# Patient Record
Sex: FEMALE | Race: WHITE | NOT HISPANIC OR LATINO | Employment: FULL TIME | URBAN - NONMETROPOLITAN AREA
[De-identification: names, ages, dates, MRNs, and addresses within clinical notes are randomized per-mention and may not be internally consistent; named-entity substitution may affect disease eponyms.]

---

## 2017-07-20 ENCOUNTER — LAB REQUISITION (OUTPATIENT)
Dept: LAB | Facility: HOSPITAL | Age: 52
End: 2017-07-20

## 2017-07-20 DIAGNOSIS — Z00.00 ENCOUNTER FOR GENERAL ADULT MEDICAL EXAMINATION WITHOUT ABNORMAL FINDINGS: ICD-10-CM

## 2017-07-20 PROCEDURE — 88305 TISSUE EXAM BY PATHOLOGIST: CPT | Performed by: GENERAL PRACTICE

## 2017-07-24 LAB
CYTO UR: NORMAL
LAB AP CASE REPORT: NORMAL
LAB AP CLINICAL INFORMATION: NORMAL
Lab: NORMAL
PATH REPORT.FINAL DX SPEC: NORMAL
PATH REPORT.GROSS SPEC: NORMAL

## 2017-10-11 ENCOUNTER — TRANSCRIBE ORDERS (OUTPATIENT)
Dept: ADMINISTRATIVE | Facility: HOSPITAL | Age: 52
End: 2017-10-11

## 2017-10-11 DIAGNOSIS — G56.03 CARPAL TUNNEL SYNDROME, BILATERAL UPPER LIMBS: Primary | ICD-10-CM

## 2017-10-23 ENCOUNTER — HOSPITAL ENCOUNTER (OUTPATIENT)
Dept: NEUROLOGY | Facility: HOSPITAL | Age: 52
Discharge: HOME | End: 2017-10-23
Admitting: PHYSICIAN ASSISTANT

## 2017-10-23 DIAGNOSIS — G56.03 CARPAL TUNNEL SYNDROME, BILATERAL UPPER LIMBS: ICD-10-CM

## 2017-10-23 PROCEDURE — 95911 NRV CNDJ TEST 9-10 STUDIES: CPT | Performed by: PSYCHIATRY & NEUROLOGY

## 2017-10-23 PROCEDURE — 95911 NRV CNDJ TEST 9-10 STUDIES: CPT

## 2017-10-31 PROCEDURE — 88302 TISSUE EXAM BY PATHOLOGIST: CPT | Performed by: GENERAL PRACTICE

## 2017-10-31 PROCEDURE — 88304 TISSUE EXAM BY PATHOLOGIST: CPT | Performed by: GENERAL PRACTICE

## 2017-11-01 ENCOUNTER — LAB REQUISITION (OUTPATIENT)
Dept: LAB | Facility: HOSPITAL | Age: 52
End: 2017-11-01

## 2017-11-01 DIAGNOSIS — Z00.00 ENCOUNTER FOR GENERAL ADULT MEDICAL EXAMINATION WITHOUT ABNORMAL FINDINGS: ICD-10-CM

## 2019-06-20 ENCOUNTER — TRANSCRIBE ORDERS (OUTPATIENT)
Dept: ADMINISTRATIVE | Facility: HOSPITAL | Age: 54
End: 2019-06-20

## 2019-06-20 DIAGNOSIS — Z12.39 SCREENING BREAST EXAMINATION: Primary | ICD-10-CM

## 2019-06-20 DIAGNOSIS — Z13.820 SCREENING FOR OSTEOPOROSIS: ICD-10-CM

## 2019-06-25 ENCOUNTER — HOSPITAL ENCOUNTER (OUTPATIENT)
Dept: MAMMOGRAPHY | Facility: HOSPITAL | Age: 54
Discharge: HOME | End: 2019-06-25
Admitting: OBSTETRICS & GYNECOLOGY

## 2019-06-25 ENCOUNTER — HOSPITAL ENCOUNTER (OUTPATIENT)
Dept: BONE DENSITY | Facility: HOSPITAL | Age: 54
Discharge: HOME | End: 2019-06-25

## 2019-06-25 DIAGNOSIS — Z13.820 SCREENING FOR OSTEOPOROSIS: ICD-10-CM

## 2019-06-25 DIAGNOSIS — Z12.39 SCREENING BREAST EXAMINATION: ICD-10-CM

## 2019-06-25 PROCEDURE — 77080 DXA BONE DENSITY AXIAL: CPT

## 2019-06-25 PROCEDURE — 77067 SCR MAMMO BI INCL CAD: CPT

## 2019-06-25 PROCEDURE — 77063 BREAST TOMOSYNTHESIS BI: CPT

## 2019-06-27 ENCOUNTER — TELEPHONE (OUTPATIENT)
Dept: NEUROSURGERY | Age: 54
End: 2019-06-27

## 2019-06-27 ENCOUNTER — TRANSCRIBE ORDERS (OUTPATIENT)
Dept: ADMINISTRATIVE | Facility: HOSPITAL | Age: 54
End: 2019-06-27

## 2019-06-27 DIAGNOSIS — N63.10 UNSPECIFIED LUMP IN THE RIGHT BREAST, UNSPECIFIED QUADRANT: Primary | ICD-10-CM

## 2019-06-27 NOTE — TELEPHONE ENCOUNTER
Neurosurgery New Patient Questionnaire      1. Referring physician? Margie Shin    2. Reason for referral?  Neck pain    3. Who is completing questionnaire? Patient    4. Has the patient had any previous spinal/brain surgeries? YES    a. If yes, [facility] the [facility]    b. What was the surgery? Cervical    c. Who was the surgeon? Patient unsure    d. When was this surgery? 2010    e. Why is the patient not following up with previous surgeon? Location/improved    f. Is this a second opinion? NO    5. Has the patient seen another neurosurgeon and/or orthopedic surgeon for this issue in the past?   NO    6. Have MRI images been obtained within the last year? MRI has been scheduled for next week at [facility].    7. If yes, where was the MRI performed? [facility]    Note: If scan was performed at a facility other than [facility], the disk will need to be brought to appointment!    a. Patient agreed to bring disk? YES    b. What part of the body? Cervical Spine    8. Has the patient had a NCV/EMG within the last year? NO    9. Has the patient participated in physical therapy within the last year? NO    10. Is the patient currently under contract with a pain management physician? YES    a. If yes, who is the physician? Dr. Man Schwartz    11. Does the patient currently take any pain medication? Suboxone    12. Employment Status? 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 Has the patient missed work due to pain? NO    15. Does the patient draw disability? NO    16. Symptoms? Neck pain with radiating pain into right shoulder and right arm. Numbess/tingling right hand. Patient states she drops things frequently. Arm/hand weakness.

## 2019-07-01 ENCOUNTER — HOSPITAL ENCOUNTER (OUTPATIENT)
Dept: MAMMOGRAPHY | Facility: HOSPITAL | Age: 54
Discharge: HOME | End: 2019-07-01
Admitting: OBSTETRICS & GYNECOLOGY

## 2019-07-01 DIAGNOSIS — N63.10 UNSPECIFIED LUMP IN THE RIGHT BREAST, UNSPECIFIED QUADRANT: ICD-10-CM

## 2019-07-01 PROCEDURE — 77065 DX MAMMO INCL CAD UNI: CPT

## 2019-07-11 ENCOUNTER — HOSPITAL ENCOUNTER (OUTPATIENT)
Dept: WOMENS IMAGING | Age: 54
Discharge: HOME | End: 2019-07-11
Payer: COMMERCIAL

## 2019-07-11 DIAGNOSIS — R92.8 ABNORMAL MAMMOGRAM: ICD-10-CM

## 2019-07-11 PROCEDURE — 2720000010 MAM STEREO BREAST BX W LOC DEVICE 1ST LESION RIGHT

## 2019-07-11 PROCEDURE — 88305 TISSUE EXAM BY PATHOLOGIST: CPT

## 2019-07-11 PROCEDURE — 77065 DX MAMMO INCL CAD UNI: CPT

## 2019-07-12 ENCOUNTER — TELEPHONE (OUTPATIENT)
Dept: OTHER | Age: 54
End: 2019-07-12

## 2019-07-15 ENCOUNTER — TELEPHONE (OUTPATIENT)
Dept: OTHER | Age: 54
End: 2019-07-15

## 2019-07-17 ENCOUNTER — APPOINTMENT (OUTPATIENT)
Dept: PREADMISSION TESTING | Facility: HOSPITAL | Age: 54
End: 2019-07-17

## 2019-07-17 VITALS
WEIGHT: 167.33 LBS | DIASTOLIC BLOOD PRESSURE: 57 MMHG | HEIGHT: 65 IN | SYSTOLIC BLOOD PRESSURE: 111 MMHG | HEART RATE: 71 BPM | RESPIRATION RATE: 16 BRPM | OXYGEN SATURATION: 95 % | BODY MASS INDEX: 27.88 KG/M2

## 2019-07-17 LAB
ALBUMIN SERPL-MCNC: 4.8 G/DL (ref 3.5–5)
ALBUMIN/GLOB SERPL: 1.5 G/DL (ref 1.1–2.5)
ALP SERPL-CCNC: 49 U/L (ref 24–120)
ALT SERPL W P-5'-P-CCNC: 27 U/L (ref 0–54)
ANION GAP SERPL CALCULATED.3IONS-SCNC: 8 MMOL/L (ref 4–13)
AST SERPL-CCNC: 37 U/L (ref 7–45)
BASOPHILS # BLD AUTO: 0.09 10*3/MM3 (ref 0–0.2)
BASOPHILS NFR BLD AUTO: 1.1 % (ref 0–2)
BILIRUB SERPL-MCNC: 0.4 MG/DL (ref 0.1–1)
BUN BLD-MCNC: 16 MG/DL (ref 5–21)
BUN/CREAT SERPL: 15.2 (ref 7–25)
CALCIUM SPEC-SCNC: 10.1 MG/DL (ref 8.4–10.4)
CHLORIDE SERPL-SCNC: 100 MMOL/L (ref 98–110)
CO2 SERPL-SCNC: 32 MMOL/L (ref 24–31)
CREAT BLD-MCNC: 1.05 MG/DL (ref 0.5–1.4)
DEPRECATED RDW RBC AUTO: 42.1 FL (ref 40–54)
EOSINOPHIL # BLD AUTO: 0.25 10*3/MM3 (ref 0–0.7)
EOSINOPHIL NFR BLD AUTO: 3 % (ref 0–4)
ERYTHROCYTE [DISTWIDTH] IN BLOOD BY AUTOMATED COUNT: 12.9 % (ref 12–15)
GFR SERPL CREATININE-BSD FRML MDRD: 55 ML/MIN/1.73
GLOBULIN UR ELPH-MCNC: 3.2 GM/DL
GLUCOSE BLD-MCNC: 109 MG/DL (ref 70–100)
HCT VFR BLD AUTO: 38.5 % (ref 37–47)
HGB BLD-MCNC: 12.5 G/DL (ref 12–16)
IMM GRANULOCYTES # BLD AUTO: 0.03 10*3/MM3 (ref 0–0.05)
IMM GRANULOCYTES NFR BLD AUTO: 0.4 % (ref 0–5)
LYMPHOCYTES # BLD AUTO: 3.52 10*3/MM3 (ref 0.72–4.86)
LYMPHOCYTES NFR BLD AUTO: 42.8 % (ref 15–45)
MCH RBC QN AUTO: 28.6 PG (ref 28–32)
MCHC RBC AUTO-ENTMCNC: 32.5 G/DL (ref 33–36)
MCV RBC AUTO: 88.1 FL (ref 82–98)
MONOCYTES # BLD AUTO: 0.56 10*3/MM3 (ref 0.19–1.3)
MONOCYTES NFR BLD AUTO: 6.8 % (ref 4–12)
NEUTROPHILS # BLD AUTO: 3.78 10*3/MM3 (ref 1.87–8.4)
NEUTROPHILS NFR BLD AUTO: 45.9 % (ref 39–78)
NRBC BLD AUTO-RTO: 0 /100 WBC (ref 0–0.2)
PLATELET # BLD AUTO: 323 10*3/MM3 (ref 130–400)
PMV BLD AUTO: 10.6 FL (ref 6–12)
POTASSIUM BLD-SCNC: 3.7 MMOL/L (ref 3.5–5.3)
PROT SERPL-MCNC: 8 G/DL (ref 6.3–8.7)
RBC # BLD AUTO: 4.37 10*6/MM3 (ref 4.2–5.4)
SODIUM BLD-SCNC: 140 MMOL/L (ref 135–145)
WBC NRBC COR # BLD: 8.23 10*3/MM3 (ref 4.8–10.8)

## 2019-07-17 PROCEDURE — 85025 COMPLETE CBC W/AUTO DIFF WBC: CPT | Performed by: SPECIALIST

## 2019-07-17 PROCEDURE — 93010 ELECTROCARDIOGRAM REPORT: CPT | Performed by: INTERNAL MEDICINE

## 2019-07-17 PROCEDURE — 36415 COLL VENOUS BLD VENIPUNCTURE: CPT

## 2019-07-17 PROCEDURE — 93005 ELECTROCARDIOGRAM TRACING: CPT

## 2019-07-17 PROCEDURE — 80053 COMPREHEN METABOLIC PANEL: CPT | Performed by: SPECIALIST

## 2019-07-17 RX ORDER — LISINOPRIL 20 MG/1
20 TABLET ORAL DAILY
COMMUNITY

## 2019-07-17 RX ORDER — ESCITALOPRAM OXALATE 20 MG/1
20 TABLET ORAL DAILY
COMMUNITY
End: 2021-09-30 | Stop reason: SINTOL

## 2019-07-17 RX ORDER — SIMVASTATIN 40 MG
40 TABLET ORAL NIGHTLY
COMMUNITY

## 2019-07-17 RX ORDER — GLIPIZIDE 10 MG/1
10 TABLET ORAL
COMMUNITY
End: 2021-09-30 | Stop reason: SINTOL

## 2019-07-17 RX ORDER — GABAPENTIN 800 MG/1
800 TABLET ORAL 4 TIMES DAILY
COMMUNITY

## 2019-07-17 RX ORDER — LEVOTHYROXINE SODIUM 0.05 MG/1
25 TABLET ORAL DAILY
COMMUNITY

## 2019-07-17 RX ORDER — PANTOPRAZOLE SODIUM 40 MG/1
40 TABLET, DELAYED RELEASE ORAL DAILY
COMMUNITY
End: 2021-09-30 | Stop reason: SINTOL

## 2019-07-17 RX ORDER — BUPRENORPHINE HYDROCHLORIDE AND NALOXONE HYDROCHLORIDE DIHYDRATE 8; 2 MG/1; MG/1
1 TABLET SUBLINGUAL DAILY
COMMUNITY

## 2019-07-17 RX ORDER — FENOFIBRATE 160 MG/1
160 TABLET ORAL DAILY
COMMUNITY

## 2019-07-17 RX ORDER — HYDROCHLOROTHIAZIDE 25 MG/1
25 TABLET ORAL DAILY
COMMUNITY

## 2019-07-17 NOTE — DISCHARGE INSTRUCTIONS
DAY OF SURGERY INSTRUCTIONS        YOUR SURGEON: DR.APRIL BERNAL    PROCEDURE: RIGHT NEEDLE DIRECTED PARTIAL MASTECTOMY AND SENTINEL LYMPH NODE BIOPSY, RADIOLOGIST TO INJECT AND SCAN    DATE OF SURGERY: July 19, 2019    ARRIVAL TIME: AS DIRECTED BY OFFICE    YOU MAY TAKE THE FOLLOWING MEDICATION(S) THE MORNING OF SURGERY WITH A SIP OF WATER: ***      ALL OTHER HOME MEDICATION CHECK WITH YOUR PHYSICIAN                MANAGING PAIN AFTER SURGERY    We know you are probably wondering what your pain will be like after surgery.  Following surgery it is unrealistic to expect you will not have pain.   Pain is how our bodies let us know that something is wrong or cautions us to be careful.  That said, our goal is to make your pain tolerable.    Methods we may use to treat your pain include (oral or IV medications, PCAs, epidurals, nerve blocks, etc.)   While some procedures require IV pain medications for a short time after surgery, transitioning to pain medications by mouth allows for better management of pain.   Your nurse will encourage you to take oral pain medications whenever possible.  IV medications work almost immediately, but only last a short while.  Taking medications by mouth allows for a more constant level of medication in your blood stream for a longer period of time.      Once your pain is out of control it is harder to get back under control.  It is important you are aware when your next dose of pain medication is due.  If you are admitted, your nurse may write the time of your next dose on the white board in your room to help you remember.      We are interested in your pain and encourage you to inform us about aggravating factors during your visit.   Many times a simple repositioning every few hours can make a big difference.    If your physician says it is okay, do not let your pain prevent you from getting out of bed. Be sure to call your nurse for assistance prior to getting up so you do not fall.       Before surgery, please decide your tolerable pain goal.  These faces help describe the pain ratings we use on a 0-10 scale.   Be prepared to tell us your goal and whether or not you take pain or anxiety medications at home.          BEFORE YOU COME TO THE HOSPITAL  (Pre-op instructions)  • Do not eat, drink, smoke or chew gum after midnight the night before surgery.  This also includes no mints.  • Morning of surgery take only the medicines you have been instructed with a sip of water unless otherwise instructed  by your physician.  • Do not shave, wear makeup or dark nail polish.  • Remove all jewelry including rings.  • Leave anything you consider valuable at home.  • Leave your suitcase in the car until after your surgery.  • Bring the following with you if applicable:  o Picture ID and insurance, Medicare or Medicaid cards  o Co-pay/deductible required by insurance (cash, check, credit card)  o Copy of advance directive, living will or power-of- documents if not brought to PAT  o CPAP or BIPAP mask and tubing  o Relaxation aids (MP3 player, book, magazine)  • On the day of surgery check in at registration located at the main entrance of the hospital.       Outpatient Surgery Guidelines, Adult  Outpatient procedures are those for which the person having the procedure is allowed to go home the same day as the procedure. Various procedures are done on an outpatient basis. You should follow some general guidelines if you will be having an outpatient procedure.  LET YOUR HEALTH CARE PROVIDER KNOW ABOUT:  · Any allergies you have.  · All medicines you are taking, including vitamins, herbs, eye drops, creams, and over-the-counter medicines.  · Previous problems you or members of your family have had with the use of anesthetics.  · Any blood disorders you have.  · Previous surgeries you have had.  · Medical conditions you have.  RISKS AND COMPLICATIONS  Your health care provider will discuss possible risks  and complications with you before surgery. Common risks and complications include:    · Problems due to the use of anesthetics.  · Blood loss and replacement (does not apply to minor surgical procedures).  · Temporary increase in pain due to surgery.  · Uncorrected pain or problems that the surgery was meant to correct.  · Infection.  · New damage.  BEFORE THE PROCEDURE  · Ask your health care provider about changing or stopping your regular medicines. You may need to stop taking certain medicines in the days or weeks before the procedure.  · Stop smoking at least 2 weeks before surgery. This lowers your risk for complications during and after surgery. Ask your health care provider for help with this if needed.  · Eat your usual meals and a light supper the day before surgery. Continue fluid intake. Do not drink alcohol.  · Do not eat or drink after midnight the night before your surgery.   · Arrange for someone to take you home and to stay with you for 24 hours after the procedure. Medicine given for your procedure may affect your ability to drive or to care for yourself.  · Call your health care provider's office if you develop an illness or problem that may prevent you from safely having your procedure.  AFTER THE PROCEDURE  After surgery, you will be taken to a recovery area, where your progress will be monitored. If there are no complications, you will be allowed to go home when you are awake, stable, and taking fluids well. You may have numbness around the surgical site. Healing will take some time. You will have tenderness at the surgical site and may have some swelling and bruising. You may also have some nausea.  HOME CARE INSTRUCTIONS  · Do not drive for 24 hours, or as directed by your health care provider. Do not drive while taking prescription pain medicines.  · Do not drink alcohol for 24 hours.  · Do not make important decisions or sign legal documents for 24 hours.  · You may resume a normal diet and  activities as directed.  · Do not lift anything heavier than 10 pounds (4.5 kg) or play contact sports until your health care provider says it is okay.  · Change your bandages (dressings) as directed.  · Only take over-the-counter or prescription medicines as directed by your health care provider.  · Follow up with your health care provider as directed.  SEEK MEDICAL CARE IF:  · You have increased bleeding (more than a small spot) from the surgical site.  · You have redness, swelling, or increasing pain in the wound.  · You see pus coming from the wound.  · You have a fever.  · You notice a bad smell coming from the wound or dressing.  · You feel lightheaded or faint.  · You develop a rash.  · You have trouble breathing.  · You develop allergies.  MAKE SURE YOU:  · Understand these instructions.  · Will watch your condition.  · Will get help right away if you are not doing well or get worse.     This information is not intended to replace advice given to you by your health care provider. Make sure you discuss any questions you have with your health care provider.     Document Released: 09/12/2002 Document Revised: 05/03/2016 Document Reviewed: 05/22/2014  [employer] Interactive Patient Education ©2016 [employer]       Fall Prevention in Hospitals, Adult  As a hospital patient, your condition and the treatments you receive can increase your risk for falls. Some additional risk factors for falls in a hospital include:  · Being in an unfamiliar environment.  · Being on bed rest.  · Your surgery.  · Taking certain medicines.  · Your tubing requirements, such as intravenous (IV) therapy or catheters.  It is important that you learn how to decrease fall risks while at the hospital. Below are important tips that can help prevent falls.  SAFETY TIPS FOR PREVENTING FALLS  Talk about your risk of falling.  · Ask your health care provider why you are at risk for falling. Is it your medicine, illness, tubing placement, or  something else?  · Make a plan with your health care provider to keep you safe from falls.  · Ask your health care provider or pharmacist about side effects of your medicines. Some medicines can make you dizzy or affect your coordination.  Ask for help.  · Ask for help before getting out of bed. You may need to press your call button.  · Ask for assistance in getting safely to the toilet.  · Ask for a walker or cane to be put at your bedside. Ask that most of the side rails on your bed be placed up before your health care provider leaves the room.  · Ask family or friends to sit with you.  · Ask for things that are out of your reach, such as your glasses, hearing aids, telephone, bedside table, or call button.  Follow these tips to avoid falling:  · Stay lying or seated, rather than standing, while waiting for help.  · Wear rubber-soled slippers or shoes whenever you walk in the hospital.  · Avoid quick, sudden movements.  ¨ Change positions slowly.  ¨ Sit on the side of your bed before standing.  ¨ Stand up slowly and wait before you start to walk.  · Let your health care provider know if there is a spill on the floor.  · Pay careful attention to the medical equipment, electrical cords, and tubes around you.  · When you need help, use your call button by your bed or in the bathroom. Wait for one of your health care providers to help you.  · If you feel dizzy or unsure of your footing, return to bed and wait for assistance.  · Avoid being distracted by the TV, telephone, or another person in your room.  · Do not lean or support yourself on rolling objects, such as IV poles or bedside tables.     This information is not intended to replace advice given to you by your health care provider. Make sure you discuss any questions you have with your health care provider.     Document Released: 12/15/2001 Document Revised: 01/08/2016 Document Reviewed: 08/25/2013  [employer] Interactive Patient Education ©2016 [employer]       Surgical Site Infections FAQs  What is a Surgical Site Infection (SSI)?  A surgical site infection is an infection that occurs after surgery in the part of the body where the surgery took place. Most patients who have surgery do not develop an infection. However, infections develop in about 1 to 3 out of every 100 patients who have surgery.  Some of the common symptoms of a surgical site infection are:  · Redness and pain around the area where you had surgery  · Drainage of cloudy fluid from your surgical wound  · Fever  Can SSIs be treated?  Yes. Most surgical site infections can be treated with antibiotics. The antibiotic given to you depends on the bacteria (germs) causing the infection. Sometimes patients with SSIs also need another surgery to treat the infection.  What are some of the things that hospitals are doing to prevent SSIs?  To prevent SSIs, doctors, nurses, and other healthcare providers:  · Clean their hands and arms up to their elbows with an antiseptic agent just before the surgery.  · Clean their hands with soap and water or an alcohol-based hand rub before and after caring for each patient.  · May remove some of your hair immediately before your surgery using electric clippers if the hair is in the same area where the procedure will occur. They should not shave you with a razor.  · Wear special hair covers, masks, gowns, and gloves during surgery to keep the surgery area clean.  · Give you antibiotics before your surgery starts. In most cases, you should get antibiotics within 60 minutes before the surgery starts and the antibiotics should be stopped within 24 hours after surgery.  · Clean the skin at the site of your surgery with a special soap that kills germs.  What can I do to help prevent SSIs?  Before your surgery:  · Tell your doctor about other medical problems you may have. Health problems such as allergies, diabetes, and obesity could affect your surgery and your  treatment.  · Quit smoking. Patients who smoke get more infections. Talk to your doctor about how you can quit before your surgery.  · Do not shave near where you will have surgery. Shaving with a razor can irritate your skin and make it easier to develop an infection.  At the time of your surgery:  · Speak up if someone tries to shave you with a razor before surgery. Ask why you need to be shaved and talk with your surgeon if you have any concerns.  · Ask if you will get antibiotics before surgery.  After your surgery:  · Make sure that your healthcare providers clean their hands before examining you, either with soap and water or an alcohol-based hand rub.  · If you do not see your providers clean their hands, please ask them to do so.  · Family and friends who visit you should not touch the surgical wound or dressings.  · Family and friends should clean their hands with soap and water or an alcohol-based hand rub before and after visiting you. If you do not see them clean their hands, ask them to clean their hands.  What do I need to do when I go home from the hospital?  · Before you go home, your doctor or nurse should explain everything you need to know about taking care of your wound. Make sure you understand how to care for your wound before you leave the hospital.  · Always clean your hands before and after caring for your wound.  · Before you go home, make sure you know who to contact if you have questions or problems after you get home.  · If you have any symptoms of an infection, such as redness and pain at the surgery site, drainage, or fever, call your doctor immediately.  If you have additional questions, please ask your doctor or nurse.  Developed and co-sponsored by The Society for Healthcare Epidemiology of [place] (SHEA); Infectious Diseases Society of [place] (IDSA); American Hospital Association; Association for Professionals in Infection Control and Epidemiology (APIC); Centers for Disease  Control and Prevention (CDC); and The Joint Commission.     This information is not intended to replace advice given to you by your health care provider. Make sure you discuss any questions you have with your health care provider.     Document Released: 12/23/2014 Document Revised: 01/08/2016 Document Reviewed: 03/02/2016  [facility] Interactive Patient Education ©2016 [employer].       [facility]  CHG 4% Patient Instruction Sheet    Preparing the Skin Before Surgery  Preparing or “prepping” skin before surgery can reduce the risk of infection at the surgical site. To make the process easier,[facility] has chosen 4% Chlorhexidine Gluconate (CHG) antiseptic solution.   The steps below outline the prepping process and should be carefully followed.                                                                                                                                                      Prep the skin at the following time(s):                                                      We recommend you shower the night before surgery, and again the morning of surgery with the 4% CHG antiseptic solution using half of the bottle and a cloth each time.  Dress in clean clothes/sleepwear after showering.  See instructions below for application.          Do not apply any lotions or moisturizers.       Do not shave the area to be prepped for at least 2 days prior to surgery.    Clipping the hair may be done immediately prior to your surgery at the hospital    if needed.    Directions:  Thoroughly rinse your body with water.  Apply 4% CHG to a cloth and wash skin gently, paying special attention to the operative site.  Rinse again thoroughly.  Once you have begun using this product do not apply anything else to your skin. If itching or redness persists, rinse affected areas and discontinue use.    When using this product:  • Keep out of eyes, ears, and mouth.  • If solution should contact these areas, rinse out promptly  and thoroughly with water.  • For external use only.  • Do not use in genital area, on your face or head.      PATIENT/FAMILY/RESPONSIBLE PARTY VERBALIZES UNDERSTANDING OF ABOVE EDUCATION.  COPY OF PAIN SCALE GIVEN AND REVIEWED WITH VERBALIZED UNDERSTANDING.

## 2019-07-18 ENCOUNTER — TRANSCRIBE ORDERS (OUTPATIENT)
Dept: ADMINISTRATIVE | Facility: HOSPITAL | Age: 54
End: 2019-07-18

## 2019-07-18 DIAGNOSIS — D05.91 CARCINOMA IN SITU OF RIGHT BREAST, UNSPECIFIED TYPE: Primary | ICD-10-CM

## 2019-07-19 ENCOUNTER — HOSPITAL ENCOUNTER (OUTPATIENT)
Dept: NUCLEAR MEDICINE | Facility: HOSPITAL | Age: 54
Discharge: HOME | End: 2019-07-19

## 2019-07-19 ENCOUNTER — ANESTHESIA (OUTPATIENT)
Dept: PERIOP | Facility: HOSPITAL | Age: 54
End: 2019-07-19

## 2019-07-19 ENCOUNTER — HOSPITAL ENCOUNTER (OUTPATIENT)
Facility: HOSPITAL | Age: 54
Setting detail: HOSPITAL OUTPATIENT SURGERY
Discharge: HOME | End: 2019-07-19
Attending: SPECIALIST | Admitting: SPECIALIST

## 2019-07-19 ENCOUNTER — HOSPITAL ENCOUNTER (OUTPATIENT)
Dept: ULTRASOUND IMAGING | Facility: HOSPITAL | Age: 54
Discharge: HOME | End: 2019-07-19

## 2019-07-19 ENCOUNTER — ANESTHESIA EVENT (OUTPATIENT)
Dept: PERIOP | Facility: HOSPITAL | Age: 54
End: 2019-07-19

## 2019-07-19 ENCOUNTER — HOSPITAL ENCOUNTER (OUTPATIENT)
Dept: MAMMOGRAPHY | Facility: HOSPITAL | Age: 54
Discharge: HOME | End: 2019-07-19

## 2019-07-19 VITALS
SYSTOLIC BLOOD PRESSURE: 115 MMHG | RESPIRATION RATE: 16 BRPM | DIASTOLIC BLOOD PRESSURE: 68 MMHG | OXYGEN SATURATION: 93 % | TEMPERATURE: 97.7 F | HEART RATE: 67 BPM

## 2019-07-19 DIAGNOSIS — R92.1 BREAST CALCIFICATIONS: ICD-10-CM

## 2019-07-19 DIAGNOSIS — D05.91 CARCINOMA IN SITU OF RIGHT BREAST, UNSPECIFIED TYPE: ICD-10-CM

## 2019-07-19 LAB
GLUCOSE BLDC GLUCOMTR-MCNC: 149 MG/DL (ref 70–130)
GLUCOSE BLDC GLUCOMTR-MCNC: 163 MG/DL (ref 70–130)

## 2019-07-19 PROCEDURE — 88307 TISSUE EXAM BY PATHOLOGIST: CPT | Performed by: SPECIALIST

## 2019-07-19 PROCEDURE — 25010000002 ONDANSETRON PER 1 MG: Performed by: NURSE ANESTHETIST, CERTIFIED REGISTERED

## 2019-07-19 PROCEDURE — 25010000002 KETOROLAC TROMETHAMINE PER 15 MG: Performed by: NURSE ANESTHETIST, CERTIFIED REGISTERED

## 2019-07-19 PROCEDURE — 25010000002 FENTANYL CITRATE (PF) 250 MCG/5ML SOLUTION: Performed by: NURSE ANESTHETIST, CERTIFIED REGISTERED

## 2019-07-19 PROCEDURE — 78195 LYMPH SYSTEM IMAGING: CPT

## 2019-07-19 PROCEDURE — 25010000002 DEXAMETHASONE PER 1 MG: Performed by: ANESTHESIOLOGY

## 2019-07-19 PROCEDURE — A9541 TC99M SULFUR COLLOID: HCPCS | Performed by: SPECIALIST

## 2019-07-19 PROCEDURE — 25010000002 MIDAZOLAM PER 1 MG: Performed by: ANESTHESIOLOGY

## 2019-07-19 PROCEDURE — 76098 X-RAY EXAM SURGICAL SPECIMEN: CPT

## 2019-07-19 PROCEDURE — 82962 GLUCOSE BLOOD TEST: CPT

## 2019-07-19 PROCEDURE — 25010000002 PROPOFOL 10 MG/ML EMULSION: Performed by: NURSE ANESTHETIST, CERTIFIED REGISTERED

## 2019-07-19 PROCEDURE — 0 TECHNETIUM FILTERED SULFUR COLLOID: Performed by: SPECIALIST

## 2019-07-19 RX ORDER — SODIUM CHLORIDE, SODIUM LACTATE, POTASSIUM CHLORIDE, CALCIUM CHLORIDE 600; 310; 30; 20 MG/100ML; MG/100ML; MG/100ML; MG/100ML
100 INJECTION, SOLUTION INTRAVENOUS CONTINUOUS
Status: DISCONTINUED | OUTPATIENT
Start: 2019-07-19 | End: 2019-07-19 | Stop reason: HOSPADM

## 2019-07-19 RX ORDER — IBUPROFEN 600 MG/1
600 TABLET ORAL ONCE AS NEEDED
Status: CANCELLED | OUTPATIENT
Start: 2019-07-19

## 2019-07-19 RX ORDER — HYDRALAZINE HYDROCHLORIDE 20 MG/ML
5 INJECTION INTRAMUSCULAR; INTRAVENOUS
Status: DISCONTINUED | OUTPATIENT
Start: 2019-07-19 | End: 2019-07-19 | Stop reason: HOSPADM

## 2019-07-19 RX ORDER — OXYCODONE HYDROCHLORIDE AND ACETAMINOPHEN 5; 325 MG/1; MG/1
1 TABLET ORAL ONCE AS NEEDED
Status: CANCELLED | OUTPATIENT
Start: 2019-07-19

## 2019-07-19 RX ORDER — NALOXONE HCL 0.4 MG/ML
0.04 VIAL (ML) INJECTION AS NEEDED
Status: DISCONTINUED | OUTPATIENT
Start: 2019-07-19 | End: 2019-07-19 | Stop reason: HOSPADM

## 2019-07-19 RX ORDER — ONDANSETRON 2 MG/ML
4 INJECTION INTRAMUSCULAR; INTRAVENOUS ONCE AS NEEDED
Status: CANCELLED | OUTPATIENT
Start: 2019-07-19

## 2019-07-19 RX ORDER — IPRATROPIUM BROMIDE AND ALBUTEROL SULFATE 2.5; .5 MG/3ML; MG/3ML
3 SOLUTION RESPIRATORY (INHALATION) ONCE AS NEEDED
Status: DISCONTINUED | OUTPATIENT
Start: 2019-07-19 | End: 2019-07-19 | Stop reason: HOSPADM

## 2019-07-19 RX ORDER — LABETALOL HYDROCHLORIDE 5 MG/ML
5 INJECTION, SOLUTION INTRAVENOUS
Status: CANCELLED | OUTPATIENT
Start: 2019-07-19

## 2019-07-19 RX ORDER — ROCURONIUM BROMIDE 10 MG/ML
INJECTION, SOLUTION INTRAVENOUS AS NEEDED
Status: DISCONTINUED | OUTPATIENT
Start: 2019-07-19 | End: 2019-07-19 | Stop reason: SURG

## 2019-07-19 RX ORDER — KETOROLAC TROMETHAMINE 30 MG/ML
INJECTION, SOLUTION INTRAMUSCULAR; INTRAVENOUS AS NEEDED
Status: DISCONTINUED | OUTPATIENT
Start: 2019-07-19 | End: 2019-07-19 | Stop reason: SURG

## 2019-07-19 RX ORDER — ONDANSETRON 2 MG/ML
4 INJECTION INTRAMUSCULAR; INTRAVENOUS AS NEEDED
Status: DISCONTINUED | OUTPATIENT
Start: 2019-07-19 | End: 2019-07-19 | Stop reason: HOSPADM

## 2019-07-19 RX ORDER — SODIUM CHLORIDE 0.9 % (FLUSH) 0.9 %
3 SYRINGE (ML) INJECTION AS NEEDED
Status: DISCONTINUED | OUTPATIENT
Start: 2019-07-19 | End: 2019-07-19 | Stop reason: HOSPADM

## 2019-07-19 RX ORDER — MIDAZOLAM HYDROCHLORIDE 1 MG/ML
2 INJECTION INTRAMUSCULAR; INTRAVENOUS
Status: DISCONTINUED | OUTPATIENT
Start: 2019-07-19 | End: 2019-07-19 | Stop reason: HOSPADM

## 2019-07-19 RX ORDER — FENTANYL CITRATE 50 UG/ML
25 INJECTION, SOLUTION INTRAMUSCULAR; INTRAVENOUS AS NEEDED
Status: DISCONTINUED | OUTPATIENT
Start: 2019-07-19 | End: 2019-07-19 | Stop reason: HOSPADM

## 2019-07-19 RX ORDER — METOCLOPRAMIDE HYDROCHLORIDE 5 MG/ML
5 INJECTION INTRAMUSCULAR; INTRAVENOUS
Status: CANCELLED | OUTPATIENT
Start: 2019-07-19

## 2019-07-19 RX ORDER — OXYCODONE HYDROCHLORIDE AND ACETAMINOPHEN 5; 325 MG/1; MG/1
1 TABLET ORAL ONCE AS NEEDED
Status: CANCELLED | OUTPATIENT
Start: 2019-07-19 | End: 2019-07-29

## 2019-07-19 RX ORDER — OXYCODONE AND ACETAMINOPHEN 10; 325 MG/1; MG/1
1 TABLET ORAL ONCE AS NEEDED
Status: COMPLETED | OUTPATIENT
Start: 2019-07-19 | End: 2019-07-19

## 2019-07-19 RX ORDER — LIDOCAINE AND PRILOCAINE 25; 25 MG/G; MG/G
CREAM TOPICAL ONCE
Status: COMPLETED | OUTPATIENT
Start: 2019-07-19 | End: 2019-07-19

## 2019-07-19 RX ORDER — MIDAZOLAM HYDROCHLORIDE 1 MG/ML
1 INJECTION INTRAMUSCULAR; INTRAVENOUS
Status: DISCONTINUED | OUTPATIENT
Start: 2019-07-19 | End: 2019-07-19 | Stop reason: HOSPADM

## 2019-07-19 RX ORDER — SODIUM CHLORIDE, SODIUM LACTATE, POTASSIUM CHLORIDE, CALCIUM CHLORIDE 600; 310; 30; 20 MG/100ML; MG/100ML; MG/100ML; MG/100ML
1000 INJECTION, SOLUTION INTRAVENOUS CONTINUOUS
Status: DISCONTINUED | OUTPATIENT
Start: 2019-07-19 | End: 2019-07-19 | Stop reason: HOSPADM

## 2019-07-19 RX ORDER — ONDANSETRON 2 MG/ML
INJECTION INTRAMUSCULAR; INTRAVENOUS AS NEEDED
Status: DISCONTINUED | OUTPATIENT
Start: 2019-07-19 | End: 2019-07-19 | Stop reason: SURG

## 2019-07-19 RX ORDER — MAGNESIUM HYDROXIDE 1200 MG/15ML
LIQUID ORAL AS NEEDED
Status: DISCONTINUED | OUTPATIENT
Start: 2019-07-19 | End: 2019-07-19 | Stop reason: HOSPADM

## 2019-07-19 RX ORDER — MORPHINE SULFATE 2 MG/ML
2 INJECTION, SOLUTION INTRAMUSCULAR; INTRAVENOUS
Status: DISCONTINUED | OUTPATIENT
Start: 2019-07-19 | End: 2019-07-19 | Stop reason: HOSPADM

## 2019-07-19 RX ORDER — SODIUM CHLORIDE 0.9 % (FLUSH) 0.9 %
3 SYRINGE (ML) INJECTION EVERY 12 HOURS SCHEDULED
Status: DISCONTINUED | OUTPATIENT
Start: 2019-07-19 | End: 2019-07-19 | Stop reason: HOSPADM

## 2019-07-19 RX ORDER — NALOXONE HCL 0.4 MG/ML
0.4 VIAL (ML) INJECTION AS NEEDED
Status: CANCELLED | OUTPATIENT
Start: 2019-07-19

## 2019-07-19 RX ORDER — SODIUM CHLORIDE 0.9 % (FLUSH) 0.9 %
1-10 SYRINGE (ML) INJECTION AS NEEDED
Status: DISCONTINUED | OUTPATIENT
Start: 2019-07-19 | End: 2019-07-19 | Stop reason: HOSPADM

## 2019-07-19 RX ORDER — ACETAMINOPHEN 500 MG
1000 TABLET ORAL ONCE
Status: COMPLETED | OUTPATIENT
Start: 2019-07-19 | End: 2019-07-19

## 2019-07-19 RX ORDER — OXYCODONE HYDROCHLORIDE AND ACETAMINOPHEN 5; 325 MG/1; MG/1
1-2 TABLET ORAL EVERY 4 HOURS PRN
Qty: 10 TABLET | Refills: 0 | Status: SHIPPED | OUTPATIENT
Start: 2019-07-19

## 2019-07-19 RX ORDER — FENTANYL CITRATE 50 UG/ML
25 INJECTION, SOLUTION INTRAMUSCULAR; INTRAVENOUS AS NEEDED
Status: CANCELLED | OUTPATIENT
Start: 2019-07-19

## 2019-07-19 RX ORDER — FAMOTIDINE 10 MG/ML
20 INJECTION, SOLUTION INTRAVENOUS
Status: COMPLETED | OUTPATIENT
Start: 2019-07-19 | End: 2019-07-19

## 2019-07-19 RX ORDER — IPRATROPIUM BROMIDE AND ALBUTEROL SULFATE 2.5; .5 MG/3ML; MG/3ML
3 SOLUTION RESPIRATORY (INHALATION) ONCE AS NEEDED
Status: CANCELLED | OUTPATIENT
Start: 2019-07-19

## 2019-07-19 RX ORDER — DEXAMETHASONE SODIUM PHOSPHATE 4 MG/ML
4 INJECTION, SOLUTION INTRA-ARTICULAR; INTRALESIONAL; INTRAMUSCULAR; INTRAVENOUS; SOFT TISSUE ONCE AS NEEDED
Status: COMPLETED | OUTPATIENT
Start: 2019-07-19 | End: 2019-07-19

## 2019-07-19 RX ORDER — METOCLOPRAMIDE HYDROCHLORIDE 5 MG/ML
5 INJECTION INTRAMUSCULAR; INTRAVENOUS
Status: DISCONTINUED | OUTPATIENT
Start: 2019-07-19 | End: 2019-07-19 | Stop reason: HOSPADM

## 2019-07-19 RX ORDER — BUPIVACAINE HYDROCHLORIDE AND EPINEPHRINE 2.5; 5 MG/ML; UG/ML
INJECTION, SOLUTION INFILTRATION; PERINEURAL AS NEEDED
Status: DISCONTINUED | OUTPATIENT
Start: 2019-07-19 | End: 2019-07-19 | Stop reason: HOSPADM

## 2019-07-19 RX ORDER — LABETALOL HYDROCHLORIDE 5 MG/ML
5 INJECTION, SOLUTION INTRAVENOUS
Status: DISCONTINUED | OUTPATIENT
Start: 2019-07-19 | End: 2019-07-19 | Stop reason: HOSPADM

## 2019-07-19 RX ORDER — FLUMAZENIL 0.1 MG/ML
0.2 INJECTION INTRAVENOUS AS NEEDED
Status: DISCONTINUED | OUTPATIENT
Start: 2019-07-19 | End: 2019-07-19 | Stop reason: HOSPADM

## 2019-07-19 RX ORDER — PROPOFOL 10 MG/ML
VIAL (ML) INTRAVENOUS AS NEEDED
Status: DISCONTINUED | OUTPATIENT
Start: 2019-07-19 | End: 2019-07-19 | Stop reason: SURG

## 2019-07-19 RX ORDER — OXYCODONE AND ACETAMINOPHEN 7.5; 325 MG/1; MG/1
1 TABLET ORAL EVERY 4 HOURS PRN
Status: CANCELLED | OUTPATIENT
Start: 2019-07-19 | End: 2019-07-29

## 2019-07-19 RX ORDER — FENTANYL CITRATE 50 UG/ML
INJECTION, SOLUTION INTRAMUSCULAR; INTRAVENOUS AS NEEDED
Status: DISCONTINUED | OUTPATIENT
Start: 2019-07-19 | End: 2019-07-19 | Stop reason: SURG

## 2019-07-19 RX ADMIN — FENTANYL CITRATE 50 MCG: 50 INJECTION INTRAMUSCULAR; INTRAVENOUS at 15:22

## 2019-07-19 RX ADMIN — SODIUM CHLORIDE, POTASSIUM CHLORIDE, SODIUM LACTATE AND CALCIUM CHLORIDE 1000 ML: 600; 310; 30; 20 INJECTION, SOLUTION INTRAVENOUS at 10:19

## 2019-07-19 RX ADMIN — TECHNETIUM TC 99M SULFUR COLLOID 1 DOSE: KIT at 13:25

## 2019-07-19 RX ADMIN — CEFAZOLIN 1 G: 1 INJECTION, POWDER, FOR SOLUTION INTRAMUSCULAR; INTRAVENOUS; PARENTERAL at 15:08

## 2019-07-19 RX ADMIN — OXYCODONE HYDROCHLORIDE AND ACETAMINOPHEN 1 TABLET: 10; 325 TABLET ORAL at 16:45

## 2019-07-19 RX ADMIN — FAMOTIDINE 20 MG: 10 INJECTION INTRAVENOUS at 14:45

## 2019-07-19 RX ADMIN — KETOROLAC TROMETHAMINE 30 MG: 30 INJECTION, SOLUTION INTRAMUSCULAR at 15:50

## 2019-07-19 RX ADMIN — LIDOCAINE AND PRILOCAINE: 25; 25 CREAM TOPICAL at 10:19

## 2019-07-19 RX ADMIN — LIDOCAINE AND PRILOCAINE 1 APPLICATION: 25; 25 CREAM TOPICAL at 12:31

## 2019-07-19 RX ADMIN — MIDAZOLAM 2 MG: 1 INJECTION INTRAMUSCULAR; INTRAVENOUS at 14:45

## 2019-07-19 RX ADMIN — PROPOFOL 200 MG: 10 INJECTION, EMULSION INTRAVENOUS at 15:02

## 2019-07-19 RX ADMIN — ONDANSETRON HYDROCHLORIDE 4 MG: 2 SOLUTION INTRAMUSCULAR; INTRAVENOUS at 15:50

## 2019-07-19 RX ADMIN — DEXAMETHASONE SODIUM PHOSPHATE 4 MG: 4 INJECTION, SOLUTION INTRAMUSCULAR; INTRAVENOUS at 14:45

## 2019-07-19 RX ADMIN — FENTANYL CITRATE 150 MCG: 50 INJECTION INTRAMUSCULAR; INTRAVENOUS at 15:00

## 2019-07-19 RX ADMIN — FENTANYL CITRATE 50 MCG: 50 INJECTION INTRAMUSCULAR; INTRAVENOUS at 15:33

## 2019-07-19 RX ADMIN — SODIUM CHLORIDE, POTASSIUM CHLORIDE, SODIUM LACTATE AND CALCIUM CHLORIDE: 600; 310; 30; 20 INJECTION, SOLUTION INTRAVENOUS at 15:35

## 2019-07-19 RX ADMIN — ROCURONIUM BROMIDE 20 MG: 10 INJECTION INTRAVENOUS at 15:02

## 2019-07-19 RX ADMIN — ACETAMINOPHEN 1000 MG: 500 TABLET, FILM COATED ORAL at 14:44

## 2019-07-19 NOTE — OP NOTE
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  The risks benefits, complications, and possible alternatives were discussed with the patient who agreed to proceed.  Description of procedure: the patient was laid supine.  The right breast and axilla were prepped and draped.  Using the neoprobe, an incision was created at the right axilla.  bovie was used to dissect to identify the sentinel lymph node. Attention was drawn to the breast.  Two localization wires were in place, one superior outer quadrant and another lower outer quadrant.  The superior was found to be in the correct location.  An incision was created. Skin flaps were created with bovie.  The tissue around the wire was removed down to the pectoralis majora.  Additional dense breast tissue medial to the tip of the wire was also excised.  The specimen was sent to radiology and the calcifications were present.  The lower wire was removed.  The wounds were copiously irrigated and the skin was closed with 3-0 and -40 vicyrl.  The sponge, needle, and instrument counts were correct.  Complications:none  Disposition:  Good to pacu  Findings:  Specimen mammo ok'd

## 2019-07-19 NOTE — ANESTHESIA PREPROCEDURE EVALUATION
Anesthesia Evaluation     Patient summary reviewed and Nursing notes reviewed   no history of anesthetic complications:  NPO Solid Status: > 8 hours  NPO Liquid Status: > 8 hours           Airway   Mallampati: I  TM distance: >3 FB  Neck ROM: full  No difficulty expected  Dental      Pulmonary    (+) a smoker Current Abstained day of surgery,   Cardiovascular   Exercise tolerance: good (4-7 METS)    ECG reviewed    (+) hypertension, hyperlipidemia,       Neuro/Psych  (+) psychiatric history Anxiety and Depression,     GI/Hepatic/Renal/Endo    (+)  GERD,  diabetes mellitus type 2, hypothyroidism,     Musculoskeletal     (+) chronic pain (on suboxone),   Abdominal    Substance History      OB/GYN          Other                      Anesthesia Plan    ASA 2     general     intravenous induction   Anesthetic plan, all risks, benefits, and alternatives have been provided, discussed and informed consent has been obtained with: patient.

## 2019-07-19 NOTE — ANESTHESIA PROCEDURE NOTES
Airway  Urgency: elective    Airway not difficult    General Information and Staff    Patient location during procedure: OR  CRNA: Rowdy Centeno CRNA    Indications and Patient Condition  Indications for airway management: airway protection    Preoxygenated: yes  MILS maintained throughout  Mask difficulty assessment: 1 - vent by mask    Final Airway Details  Final airway type: endotracheal airway      Successful airway: ETT  Cuffed: yes   Successful intubation technique: direct laryngoscopy  Endotracheal tube insertion site: oral  Blade: Roth  Blade size: 2  ETT size (mm): 7.0  Cormack-Lehane Classification: grade I - full view of glottis  Placement verified by: chest auscultation and capnometry   Cuff volume (mL): 5  Measured from: gums  ETT to gums (cm): 20  Number of attempts at approach: 1

## 2019-07-19 NOTE — DISCHARGE INSTRUCTIONS

## 2019-07-20 NOTE — ANESTHESIA POSTPROCEDURE EVALUATION
Patient: Ember Cedeno    Procedure Summary     Date:  07/19/19 Room / Location:   [facility] 08 /  [facility]    Anesthesia Start:  1459 Anesthesia Stop:  1624    Procedure:  RIGHT NEEDLE DIRECTED PARTIAL MASTECTOMY, MAMMO GUIDED AND SENTINEL LYMPH NODE BIOPSY, RADIOLOGIST TO INJECT AND SCAN (Right Breast) Diagnosis:  (BREAST CANCER)    Surgeon:  Ana Muñoz MD Provider:  Rowdy Centeno CRNA    Anesthesia Type:  general ASA Status:  2          Anesthesia Type: general  Last vitals  BP   115/68 (07/19/19 1748)   Temp   97.7 °F (36.5 °C) (07/19/19 1700)   Pulse   67 (07/19/19 1748)   Resp   16 (07/19/19 1748)     SpO2   93 % (07/19/19 1748)     Post Anesthesia Care and Evaluation    Patient location during evaluation: PACU  Patient participation: complete - patient participated  Level of consciousness: awake and alert  Pain management: adequate  Airway patency: patent  Anesthetic complications: No anesthetic complications  PONV Status: none  Cardiovascular status: acceptable and hemodynamically stable  Respiratory status: acceptable  Hydration status: acceptable    Comments: Blood pressure 115/68, pulse 67, temperature 97.7 °F (36.5 °C), temperature source Temporal, resp. rate 16, SpO2 93 %, not currently breastfeeding.    Patient discharged from PACU based upon Tatyana score. Please see RN notes for further details

## 2019-07-23 ENCOUNTER — OFFICE VISIT (OUTPATIENT)
Dept: NEUROSURGERY | Age: 54
End: 2019-07-23
Payer: COMMERCIAL

## 2019-07-23 VITALS
SYSTOLIC BLOOD PRESSURE: 137 MMHG | BODY MASS INDEX: 27.82 KG/M2 | HEART RATE: 65 BPM | DIASTOLIC BLOOD PRESSURE: 86 MMHG | WEIGHT: 167 LBS | HEIGHT: 65 IN

## 2019-07-23 DIAGNOSIS — Z98.1 S/P CERVICAL SPINAL FUSION: Primary | ICD-10-CM

## 2019-07-23 DIAGNOSIS — M50.30 DDD (DEGENERATIVE DISC DISEASE), CERVICAL: ICD-10-CM

## 2019-07-23 DIAGNOSIS — M47.812 CERVICAL SPONDYLOSIS WITHOUT MYELOPATHY: ICD-10-CM

## 2019-07-23 DIAGNOSIS — M48.02 FORAMINAL STENOSIS OF CERVICAL REGION: ICD-10-CM

## 2019-07-23 DIAGNOSIS — M79.601 CHRONIC PAIN OF RIGHT UPPER EXTREMITY: ICD-10-CM

## 2019-07-23 DIAGNOSIS — M54.2 NECK PAIN: ICD-10-CM

## 2019-07-23 DIAGNOSIS — G89.29 CHRONIC PAIN OF RIGHT UPPER EXTREMITY: ICD-10-CM

## 2019-07-23 PROCEDURE — 99204 OFFICE O/P NEW MOD 45 MIN: CPT | Performed by: NEUROLOGICAL SURGERY

## 2019-07-23 RX ORDER — SIMVASTATIN 40 MG
TABLET ORAL
Refills: 5 | COMMUNITY
Start: 2019-07-18

## 2019-07-23 RX ORDER — BUPRENORPHINE HYDROCHLORIDE AND NALOXONE HYDROCHLORIDE DIHYDRATE 8; 2 MG/1; MG/1
TABLET SUBLINGUAL
Refills: 0 | COMMUNITY
Start: 2019-07-20

## 2019-07-23 RX ORDER — GLIPIZIDE 10 MG/1
TABLET ORAL
COMMUNITY
Start: 2019-07-08

## 2019-07-23 RX ORDER — PANTOPRAZOLE SODIUM 40 MG/1
TABLET, DELAYED RELEASE ORAL
Refills: 5 | COMMUNITY
Start: 2019-07-18

## 2019-07-23 RX ORDER — HYDROCHLOROTHIAZIDE 25 MG/1
TABLET ORAL
COMMUNITY
Start: 2019-07-06

## 2019-07-23 RX ORDER — FENOFIBRATE 160 MG/1
TABLET ORAL
Refills: 5 | COMMUNITY
Start: 2019-07-18

## 2019-07-23 RX ORDER — LISINOPRIL 20 MG/1
TABLET ORAL
Refills: 5 | COMMUNITY
Start: 2019-07-18

## 2019-07-23 RX ORDER — GABAPENTIN 800 MG/1
TABLET ORAL
Refills: 5 | COMMUNITY
Start: 2019-07-18

## 2019-07-23 RX ORDER — LEVOTHYROXINE SODIUM 0.03 MG/1
TABLET ORAL
COMMUNITY
Start: 2019-07-06

## 2019-07-23 RX ORDER — ERGOCALCIFEROL 1.25 MG/1
CAPSULE ORAL
Refills: 5 | COMMUNITY
Start: 2019-07-18

## 2019-07-23 RX ORDER — ESCITALOPRAM OXALATE 20 MG/1
40 TABLET ORAL DAILY
Refills: 5 | COMMUNITY
Start: 2019-07-18

## 2019-07-23 SDOH — HEALTH STABILITY: MENTAL HEALTH: HOW OFTEN DO YOU HAVE A DRINK CONTAINING ALCOHOL?: NEVER

## 2019-07-23 ASSESSMENT — ENCOUNTER SYMPTOMS
CONSTIPATION: 1
RESPIRATORY NEGATIVE: 1
HEARTBURN: 1
DIARRHEA: 1
BACK PAIN: 1
EYES NEGATIVE: 1

## 2019-07-23 NOTE — PROGRESS NOTES
throughout and some moderate narrowing, however, her pain pattern and imaging do not perfectly correlate. We do not recommend an additional neurosurgical intervention at this time. We recommend that she continue with non-operative treatment. She would like to hold off on PT at this time.   -Follow up as needed         Note: A total of >50% (30 minutes) of 45minutes was spent discussing the pathophysiology and treatment and/or coordination of care of the above diagnoses.       Jessie Hathaway, DO

## 2019-09-11 NOTE — PROGRESS NOTES
MGW ONC [facility] MEDICAL GROUP HEMATOLOGY AND ONCOLOGY  [address]  [facility] [address] [phone]    Patient Name: Ember Cedeno  Encounter Date: 2019  YOB: 1965  Patient Number: [id number]    Initial Note    REASON FOR CONSULTATION: Ember Cedeno is a pleasant 54 y.o.  female referred by Dr. Ana Muñoz for  management recommendations for ductal carcinoma in situ right breast.  She is seen with spouse, José Manuel. History is obtained from patient. History is considered to be accurate.    HISTORY OF PRESENT ILLNESS: She had undergone bilateral screening mammogram on 2019 at [facility].  New group of suspicious calcification right breast upper outer quadrant.      The patient was seen by Dr. Ana Muñoz 2019.  She was scheduled for right stereotactic biopsy.     Biopsy report 2019 ductal carcinoma in situ favor high-grade.  Largest focus of in situ carcinoma measures 0.4 cm.  In situ carcinoma present in multiple cores.    Patient was seen by Dr. Muñoz on follow-up 2019.  Plan for partial mastectomy with sentinel lymph node biopsy.    She had undergone right needle directed partial mastectomy with sentinel lymph node biopsy by Dr. Ana Muñoz 2019.    Pathology report 2019 showed sentinel lymph node negative for tumor.  Ductal carcinoma in situ grade 2-3 with focal comedo necrosis.  DCIS is close to apparent resection margins in multiple locations focally less than 1 mm.      The patient was referred to radiation medical oncology.    No family history of ovarian or breast cancer.     She had menarche at 14.  .  Age of first delivery at 22.  She had used birth control pills.  No hormone replacement.     With the above background, she is seen      LABS    Lab Results - Last 18 Months   Lab Units 19  1653   HEMOGLOBIN g/dL 12.5   HEMATOCRIT % 38.5   MCV fL 88.1   WBC  10*3/mm3 8.23   RDW % 12.9   MPV fL 10.6   PLATELETS 10*3/mm3 323   IMM GRAN % % 0.4   NEUTROS ABS 10*3/mm3 3.78   LYMPHS ABS 10*3/mm3 3.52   MONOS ABS 10*3/mm3 0.56   EOS ABS 10*3/mm3 0.25   BASOS ABS 10*3/mm3 0.09   IMMATURE GRANS (ABS) 10*3/mm3 0.03   NRBC /100 WBC 0.0       Lab Results - Last 18 Months   Lab Units 07/17/19  1652   GLUCOSE mg/dL 109*   SODIUM mmol/L 140   POTASSIUM mmol/L 3.7   CO2 mmol/L 32.0*   CHLORIDE mmol/L 100   ANION GAP mmol/L 8.0   CREATININE mg/dL 1.05   BUN mg/dL 16   BUN / CREAT RATIO  15.2   CALCIUM mg/dL 10.1   EGFR IF NONAFRICN AM mL/min/1.73 55*   ALK PHOS U/L 49   TOTAL PROTEIN g/dL 8.0   ALT (SGPT) U/L 27   AST (SGOT) U/L 37   BILIRUBIN mg/dL 0.4   ALBUMIN g/dL 4.80   GLOBULIN gm/dL 3.2       No results for input(s): MSPIKE, KAPPALAMB, IGLFLC, URICACID, FREEKAPPAL, CEA, LDH, REFLABREPO in the last 39717 hours.    No results for input(s): IRON, TIBC, LABIRON, FERRITIN, N5KEAMW, TSH, FOLATE in the last 66874 hours.    Invalid input(s): VITB12      PAST MEDICAL HISTORY:  ALLERGIES:  No Known Allergies  CURRENT MEDICATIONS:  Outpatient Encounter Medications as of 9/17/2019   Medication Sig Dispense Refill   • buprenorphine-naloxone (SUBOXONE) 8-2 MG per SL tablet Place 1 tablet under the tongue Daily.     • escitalopram (LEXAPRO) 20 MG tablet Take 20 mg by mouth Daily.     • fenofibrate 160 MG tablet Take 160 mg by mouth Daily.     • gabapentin (NEURONTIN) 800 MG tablet Take 800 mg by mouth 4 (Four) Times a Day.     • glipiZIDE (GLUCOTROL) 10 MG tablet Take 10 mg by mouth 2 (Two) Times a Day Before Meals.     • hydrochlorothiazide (HYDRODIURIL) 25 MG tablet Take 25 mg by mouth Daily.     • levothyroxine (SYNTHROID, LEVOTHROID) 50 MCG tablet Take 25 mcg by mouth Daily.     • lisinopril (PRINIVIL,ZESTRIL) 20 MG tablet Take 20 mg by mouth Daily.     • metFORMIN (GLUCOPHAGE) 500 MG tablet Take 500 mg by mouth 2 (Two) Times a Day With Meals.     • oxyCODONE-acetaminophen (PERCOCET) 5-325  MG per tablet Take 1-2 tablets by mouth Every 4 (Four) Hours As Needed (Pain). 10 tablet 0   • pantoprazole (PROTONIX) 40 MG EC tablet Take 40 mg by mouth Daily.     • simvastatin (ZOCOR) 40 MG tablet Take 40 mg by mouth Every Night.       No facility-administered encounter medications on file as of 2019.      ADULT ILLNESSES: Diabetes mellitus, hypertension, hypercholesterolemia, and obesity.  Patient Active Problem List   Diagnosis Code   • Ductal carcinoma in situ (DCIS) of right breast D05.11     SURGERIES:  Past Surgical History:   Procedure Laterality Date   • APPENDECTOMY     • BILATERAL BREAST REDUCTION     •  SECTION      X2   • CHOLECYSTECTOMY     • ERCP     • HERNIA REPAIR     • HYSTERECTOMY      PARTIAL   • KNEE ACL RECONSTRUCTION Right    • MASTECTOMY W/ SENTINEL NODE BIOPSY Right 2019    Procedure: RIGHT NEEDLE DIRECTED PARTIAL MASTECTOMY, MAMMO GUIDED AND SENTINEL LYMPH NODE BIOPSY, RADIOLOGIST TO INJECT AND SCAN;  Surgeon: Ana Muñoz MD;  Location: [facility] OR;  Service: General   • REDUCTION MAMMAPLASTY       HEALTH MAINTENANCE ITEMS:  Health Maintenance Due   Topic Date Due   • URINE MICROALBUMIN  1966   • ANNUAL PHYSICAL  1969   • PNEUMOCOCCAL VACCINE (19-64 MEDIUM RISK) (1 of 1 - PPSV23) 1985   • TDAP/TD VACCINES (1 - Tdap) 1985   • ZOSTER VACCINE (1 of 2) 2016   • DIABETIC FOOT EXAM  2019   • PAP SMEAR  2019   • LIPID PANEL  2019   • HEMOGLOBIN A1C  2019   • DIABETIC EYE EXAM  2019   • COLONOSCOPY  2019   • INFLUENZA VACCINE  2019       <no information>  Last Completed Colonoscopy       Status Date      COLONOSCOPY No completions recorded          There is no immunization history on file for this patient.  Last Completed Mammogram       Status Date      MAMMOGRAM Done 2019 Ext Proc: HC MAMMOGRAM DIAGNOSTIC UNILAT DIGITAL W CAD     Patient has more history with this topic...            FAMILY  "HISTORY:  Family History   Problem Relation Age of Onset   • Breast cancer Neg Hx      SOCIAL HISTORY:  Social History     Socioeconomic History   • Marital status:      Spouse name: Not on file   • Number of children: Not on file   • Years of education: Not on file   • Highest education level: Not on file   Tobacco Use   • Smoking status: Current Every Day Smoker     Packs/day: 1.00   • Smokeless tobacco: Never Used   Substance and Sexual Activity   • Alcohol use: No     Frequency: Never   • Drug use: No   • Sexual activity: Defer       REVIEW OF SYSTEMS:  Review of Systems   Constitutional: Negative for appetite change, chills, diaphoresis, fatigue, fever and unexpected weight loss.   HENT: Negative for mouth sores, nosebleeds, sinus pressure, sore throat and trouble swallowing.    Eyes: Negative for blurred vision, double vision, pain and visual disturbance.   Respiratory: Negative for cough, shortness of breath and wheezing.    Cardiovascular: Negative for chest pain, palpitations and leg swelling.   Gastrointestinal: Negative for abdominal pain, blood in stool, constipation, diarrhea, nausea and vomiting.   Endocrine: Negative for cold intolerance and heat intolerance.   Genitourinary: Negative for breast lump, dysuria, frequency, hematuria, urgency and urinary incontinence.   Musculoskeletal: Negative for arthralgias and myalgias.   Skin: Negative for pallor, rash and skin lesions.   Allergic/Immunologic: Negative for food allergies and immunocompromised state.   Neurological: Negative for dizziness, tremors, seizures, syncope, speech difficulty, weakness, headache and confusion.   Hematological: Negative for adenopathy. Does not bruise/bleed easily.   Psychiatric/Behavioral: Negative for behavioral problems, dysphoric mood, hallucinations and depressed mood.       /64   Pulse 68   Temp 97.6 °F (36.4 °C)   Resp 18   Ht 165.1 cm (65\")   Wt 73.5 kg (162 lb)   SpO2 94%   Breastfeeding? No   " BMI 26.96 kg/m²  Body surface area is 1.81 meters squared.  Pain Score    09/17/19 1117   PainSc: 0-No pain       Physical Exam:  Physical Exam   Constitutional: She is oriented to person, place, and time. She appears well-developed and well-nourished. No distress.   HENT:   Head: Normocephalic and atraumatic.   Eyes: EOM are normal. Pupils are equal, round, and reactive to light. No scleral icterus.   Neck: Trachea normal. Neck supple. No JVD present.   Cardiovascular: Normal rate, regular rhythm and normal pulses.   No murmur heard.  Pulmonary/Chest: Effort normal and breath sounds normal. She has no wheezes. She has no rhonchi. She has no rales. Chest wall is not dull to percussion.   Lumpectomy scar, right.    Abdominal: Soft. Normal appearance and bowel sounds are normal. There is no tenderness. There is no rebound and no guarding.   Musculoskeletal: She exhibits no edema.   Lymphadenopathy:     She has no cervical adenopathy.     She has no axillary adenopathy.        Right: No inguinal and no supraclavicular adenopathy present.        Left: No inguinal and no supraclavicular adenopathy present.   Neurological: She is alert and oriented to person, place, and time. She has normal strength. No sensory deficit.   Skin: Skin is warm and dry. She is not diaphoretic. No pallor.   Psychiatric: She has a normal mood and affect. Her behavior is normal. Judgment and thought content normal.   Vitals reviewed.      Ember Cedeno reports a pain score of 0.      Patient's Body mass index is 26.96 kg/m². BMI is within normal parameters. No follow-up required..      ASSESSMENT:  1.   Ductal arcinoma of the right breast, upper outer quadrant.  Current Stage: AJCC 0  (Tis, N0, M0, G2-3)   Tumor size 0.4 cm.  Hormone Status: ER GA  and HER-2/ivonne pending.  Baseline Node Status: 0/1 positive sentinel node.  Complications of Tumor: None.  Prognosis: Good.   2.   Performance status of 0.   3.   Hypertension.  4.   Type 2  diabetes.  5.   Hypothyroidism.  6.   Hypercholesterolemia.       PLAN:  1.     Re:  In general terms about adjuvant management of stage 0 breast cancer.  2.     Re:  Rationale for prophylactic tamoxifen after adjuvant radiation based on the NCCN guidelines.  3.     Re:  Await receptor status.  4.    Blood for CBC with differential and CMP.  5.    Keep appointment with Dr. Jo for adjuvant radiation.   6.    eRx for tamoxifen 20 mg p.o. daily, #90 with 3 refills [place].  Await receptor status.  Aware of potential cataract, thrombosis, and hot flashes.  7.    Continue currently identified medications.  8.    Continue ongoing management per primary care physician and other specialists.  9.    Plan of care discussed with patient and spouse.  Understanding expressed.  Patient agreeable to proceed.  10.  She will be seen every 6 months for 5 years.  Annual mammogram.   11.  Schedule next mammogram 07/2020.  12.  Ask pathology for estrogen receptor.  13.  Return to office in 6 months with pre-office CBC with differential and CMP.  14.  Further recommendations pending.  15.  Questions were answered to their satisfaction.     Thank you for the referral.      TIME SPENT: Face-to-face time on this encounter as defined by the American Medical Association in the 2018 Current Procedural Terminology code book is 64 minutes.      cc:  MD Pradeep Clayton MD Terry Boone, PA

## 2019-09-17 ENCOUNTER — CONSULT (OUTPATIENT)
Dept: ONCOLOGY | Facility: CLINIC | Age: 54
End: 2019-09-17

## 2019-09-17 ENCOUNTER — LAB (OUTPATIENT)
Dept: LAB | Facility: HOSPITAL | Age: 54
End: 2019-09-17

## 2019-09-17 VITALS
OXYGEN SATURATION: 94 % | RESPIRATION RATE: 18 BRPM | TEMPERATURE: 97.6 F | BODY MASS INDEX: 26.99 KG/M2 | SYSTOLIC BLOOD PRESSURE: 122 MMHG | DIASTOLIC BLOOD PRESSURE: 64 MMHG | HEIGHT: 65 IN | HEART RATE: 68 BPM | WEIGHT: 162 LBS

## 2019-09-17 DIAGNOSIS — D05.11 DUCTAL CARCINOMA IN SITU (DCIS) OF RIGHT BREAST: ICD-10-CM

## 2019-09-17 DIAGNOSIS — D05.11 DUCTAL CARCINOMA IN SITU (DCIS) OF RIGHT BREAST: Primary | ICD-10-CM

## 2019-09-17 LAB
ALBUMIN SERPL-MCNC: 4.6 G/DL (ref 3.5–5.2)
ALBUMIN/GLOB SERPL: 1.4 G/DL
ALP SERPL-CCNC: 41 U/L (ref 39–117)
ALT SERPL W P-5'-P-CCNC: 22 U/L (ref 1–33)
ANION GAP SERPL CALCULATED.3IONS-SCNC: 8 MMOL/L (ref 5–15)
AST SERPL-CCNC: 23 U/L (ref 1–32)
BASOPHILS # BLD AUTO: 0.1 10*3/MM3 (ref 0–0.2)
BASOPHILS NFR BLD AUTO: 1.6 % (ref 0–1.5)
BILIRUB SERPL-MCNC: 0.2 MG/DL (ref 0.2–1.2)
BUN BLD-MCNC: 16 MG/DL (ref 6–20)
BUN/CREAT SERPL: 14.8 (ref 7–25)
CALCIUM SPEC-SCNC: 10.1 MG/DL (ref 8.6–10.5)
CHLORIDE SERPL-SCNC: 101 MMOL/L (ref 98–107)
CO2 SERPL-SCNC: 33 MMOL/L (ref 22–29)
CREAT BLD-MCNC: 1.08 MG/DL (ref 0.57–1)
DEPRECATED RDW RBC AUTO: 41.1 FL (ref 37–54)
EOSINOPHIL # BLD AUTO: 0.19 10*3/MM3 (ref 0–0.4)
EOSINOPHIL NFR BLD AUTO: 3.1 % (ref 0.3–6.2)
ERYTHROCYTE [DISTWIDTH] IN BLOOD BY AUTOMATED COUNT: 12.8 % (ref 12.3–15.4)
GFR SERPL CREATININE-BSD FRML MDRD: 53 ML/MIN/1.73
GLOBULIN UR ELPH-MCNC: 3.2 GM/DL
GLUCOSE BLD-MCNC: 144 MG/DL (ref 65–99)
HCT VFR BLD AUTO: 38.1 % (ref 34–46.6)
HGB BLD-MCNC: 12.4 G/DL (ref 12–15.9)
IMM GRANULOCYTES # BLD AUTO: 0.01 10*3/MM3 (ref 0–0.05)
IMM GRANULOCYTES NFR BLD AUTO: 0.2 % (ref 0–0.5)
LYMPHOCYTES # BLD AUTO: 2.81 10*3/MM3 (ref 0.7–3.1)
LYMPHOCYTES NFR BLD AUTO: 45.6 % (ref 19.6–45.3)
MCH RBC QN AUTO: 28.7 PG (ref 26.6–33)
MCHC RBC AUTO-ENTMCNC: 32.5 G/DL (ref 31.5–35.7)
MCV RBC AUTO: 88.2 FL (ref 79–97)
MONOCYTES # BLD AUTO: 0.48 10*3/MM3 (ref 0.1–0.9)
MONOCYTES NFR BLD AUTO: 7.8 % (ref 5–12)
NEUTROPHILS # BLD AUTO: 2.57 10*3/MM3 (ref 1.7–7)
NEUTROPHILS NFR BLD AUTO: 41.7 % (ref 42.7–76)
NRBC BLD AUTO-RTO: 0 /100 WBC (ref 0–0.2)
PLATELET # BLD AUTO: 312 10*3/MM3 (ref 140–450)
PMV BLD AUTO: 11.3 FL (ref 6–12)
POTASSIUM BLD-SCNC: 4.2 MMOL/L (ref 3.5–5.2)
PROT SERPL-MCNC: 7.8 G/DL (ref 6–8.5)
RBC # BLD AUTO: 4.32 10*6/MM3 (ref 3.77–5.28)
SODIUM BLD-SCNC: 142 MMOL/L (ref 136–145)
WBC NRBC COR # BLD: 6.16 10*3/MM3 (ref 3.4–10.8)

## 2019-09-17 PROCEDURE — 99205 OFFICE O/P NEW HI 60 MIN: CPT | Performed by: INTERNAL MEDICINE

## 2019-09-17 PROCEDURE — 80053 COMPREHEN METABOLIC PANEL: CPT | Performed by: INTERNAL MEDICINE

## 2019-09-17 PROCEDURE — 85025 COMPLETE CBC W/AUTO DIFF WBC: CPT | Performed by: INTERNAL MEDICINE

## 2019-09-17 PROCEDURE — 36415 COLL VENOUS BLD VENIPUNCTURE: CPT

## 2019-09-19 LAB
LAB AP CASE REPORT: NORMAL
LAB AP DIAGNOSIS COMMENT: NORMAL
LAB AP SYNOPTIC CHECKLIST: NORMAL
PATH REPORT.FINAL DX SPEC: NORMAL
PATH REPORT.GROSS SPEC: NORMAL

## 2019-09-26 RX ORDER — TAMOXIFEN CITRATE 20 MG/1
20 TABLET ORAL DAILY
Qty: 90 TABLET | Refills: 3 | Status: SHIPPED | OUTPATIENT
Start: 2019-09-26 | End: 2021-03-08 | Stop reason: SDUPTHER

## 2019-09-26 NOTE — TELEPHONE ENCOUNTER
Per instruction from Dr Motley script called to [facility] pharmacy [facility]  TMX 20 po qd # 90 with 3 refills

## 2020-03-18 ENCOUNTER — TELEPHONE (OUTPATIENT)
Dept: ONCOLOGY | Facility: CLINIC | Age: 55
End: 2020-03-18

## 2020-03-18 NOTE — TELEPHONE ENCOUNTER
Voicemail was left for patient to come in for labs and appointment for Dr. Motley at the end of May. Appointment dates and times were left in voicemail.

## 2020-05-22 ENCOUNTER — LAB (OUTPATIENT)
Dept: LAB | Facility: HOSPITAL | Age: 55
End: 2020-05-22

## 2020-05-22 DIAGNOSIS — D05.11 DUCTAL CARCINOMA IN SITU (DCIS) OF RIGHT BREAST: ICD-10-CM

## 2020-05-22 LAB
ALBUMIN SERPL-MCNC: 4.7 G/DL (ref 3.5–5.2)
ALBUMIN/GLOB SERPL: 1.9 G/DL
ALP SERPL-CCNC: 41 U/L (ref 39–117)
ALT SERPL W P-5'-P-CCNC: 25 U/L (ref 1–33)
ANION GAP SERPL CALCULATED.3IONS-SCNC: 9 MMOL/L (ref 5–15)
AST SERPL-CCNC: 28 U/L (ref 1–32)
BASOPHILS # BLD AUTO: 0.08 10*3/MM3 (ref 0–0.2)
BASOPHILS NFR BLD AUTO: 1.5 % (ref 0–1.5)
BILIRUB SERPL-MCNC: 0.3 MG/DL (ref 0.2–1.2)
BUN BLD-MCNC: 12 MG/DL (ref 6–20)
BUN/CREAT SERPL: 14.8 (ref 7–25)
CALCIUM SPEC-SCNC: 10.1 MG/DL (ref 8.6–10.5)
CHLORIDE SERPL-SCNC: 98 MMOL/L (ref 98–107)
CO2 SERPL-SCNC: 30 MMOL/L (ref 22–29)
CREAT BLD-MCNC: 0.81 MG/DL (ref 0.57–1)
DEPRECATED RDW RBC AUTO: 40 FL (ref 37–54)
EOSINOPHIL # BLD AUTO: 0.19 10*3/MM3 (ref 0–0.4)
EOSINOPHIL NFR BLD AUTO: 3.6 % (ref 0.3–6.2)
ERYTHROCYTE [DISTWIDTH] IN BLOOD BY AUTOMATED COUNT: 12.7 % (ref 12.3–15.4)
GFR SERPL CREATININE-BSD FRML MDRD: 74 ML/MIN/1.73
GLOBULIN UR ELPH-MCNC: 2.5 GM/DL
GLUCOSE BLD-MCNC: 221 MG/DL (ref 65–99)
HCT VFR BLD AUTO: 36.1 % (ref 34–46.6)
HGB BLD-MCNC: 12.1 G/DL (ref 12–15.9)
IMM GRANULOCYTES # BLD AUTO: 0.01 10*3/MM3 (ref 0–0.05)
IMM GRANULOCYTES NFR BLD AUTO: 0.2 % (ref 0–0.5)
LYMPHOCYTES # BLD AUTO: 1.68 10*3/MM3 (ref 0.7–3.1)
LYMPHOCYTES NFR BLD AUTO: 32.2 % (ref 19.6–45.3)
MCH RBC QN AUTO: 29 PG (ref 26.6–33)
MCHC RBC AUTO-ENTMCNC: 33.5 G/DL (ref 31.5–35.7)
MCV RBC AUTO: 86.6 FL (ref 79–97)
MONOCYTES # BLD AUTO: 0.43 10*3/MM3 (ref 0.1–0.9)
MONOCYTES NFR BLD AUTO: 8.2 % (ref 5–12)
NEUTROPHILS # BLD AUTO: 2.83 10*3/MM3 (ref 1.7–7)
NEUTROPHILS NFR BLD AUTO: 54.3 % (ref 42.7–76)
NRBC BLD AUTO-RTO: 0 /100 WBC (ref 0–0.2)
PLATELET # BLD AUTO: 250 10*3/MM3 (ref 140–450)
PMV BLD AUTO: 11.1 FL (ref 6–12)
POTASSIUM BLD-SCNC: 3.8 MMOL/L (ref 3.5–5.2)
PROT SERPL-MCNC: 7.2 G/DL (ref 6–8.5)
RBC # BLD AUTO: 4.17 10*6/MM3 (ref 3.77–5.28)
SODIUM BLD-SCNC: 137 MMOL/L (ref 136–145)
WBC NRBC COR # BLD: 5.22 10*3/MM3 (ref 3.4–10.8)

## 2020-05-22 PROCEDURE — 80053 COMPREHEN METABOLIC PANEL: CPT

## 2020-05-22 PROCEDURE — 36415 COLL VENOUS BLD VENIPUNCTURE: CPT

## 2020-05-22 PROCEDURE — 85025 COMPLETE CBC W/AUTO DIFF WBC: CPT

## 2020-05-22 NOTE — PROGRESS NOTES
MGW ONC [facility] MEDICAL GROUP HEMATOLOGY AND ONCOLOGY  [address]  [facility] [address] [phone]    Patient Name: Ember Cedeno  Encounter Date: 05/29/2020  YOB: 1965  Patient Number: [id number]      REASON FOR FOLLOW-UP: Ember Cedeno is a pleasant 55 y.o.  female who is seen on follow-up for ductal carcinoma in situ right breast.  She is on prophylactic tamoxifen, almost 7 months.  She is seen alone. History is obtained from patient. History is considered to be accurate.      Oncology/Hematology History    DIAGNOSTIC ABNORMALITIES:  She had undergone bilateral screening mammogram on 07/01/2019 at [facility].  New group of suspicious calcification right breast upper outer quadrant.    The patient was seen by Dr. Ana Muñoz 07/08/2019.  She was scheduled for right stereotactic biopsy.   Biopsy report 07/12/2019 ductal carcinoma in situ favor high-grade.  Largest focus of in situ carcinoma measures 0.4 cm.  In situ carcinoma present in multiple cores.  Patient was seen by Dr. Muñoz on follow-up 07/17/2019.  Plan for partial mastectomy with sentinel lymph node biopsy.  Pathology report 07/22/2019 showed sentinel lymph node negative for tumor.  Ductal carcinoma in situ grade 2-3 with focal comedo necrosis.  DCIS is close to apparent resection margins in multiple locations focally less than 1 mm.        PREVIOUS INTERVENTIONS:  She had undergone right needle directed partial mastectomy with sentinel lymph node biopsy by Dr. Ana Muñoz 07/19/2019.  Adjuvant radiation completed 10/31/2019  Prophylactic tamoxifen 11/01/2020 through present.          Ductal carcinoma in situ (DCIS) of right breast    9/17/2019 Initial Diagnosis     Ductal carcinoma in situ (DCIS) of right breast      5/22/2020 Cancer Staged     Staging form: Breast, AJCC 8th Edition  - Pathologic stage from 5/22/2020: Stage 0 (pTis (DCIS), pN0, cM0, G3, ER+,  MA+, HER2: Not Assessed) - Signed by Vinicius Motley MD on 2020         PAST MEDICAL HISTORY:  ALLERGIES:  No Known Allergies  CURRENT MEDICATIONS:  Outpatient Encounter Medications as of 2020   Medication Sig Dispense Refill   • buprenorphine-naloxone (SUBOXONE) 8-2 MG per SL tablet Place 1 tablet under the tongue Daily.     • escitalopram (LEXAPRO) 20 MG tablet Take 20 mg by mouth Daily.     • fenofibrate 160 MG tablet Take 160 mg by mouth Daily.     • gabapentin (NEURONTIN) 800 MG tablet Take 800 mg by mouth 4 (Four) Times a Day.     • glipiZIDE (GLUCOTROL) 10 MG tablet Take 10 mg by mouth 2 (Two) Times a Day Before Meals.     • hydrochlorothiazide (HYDRODIURIL) 25 MG tablet Take 25 mg by mouth Daily.     • levothyroxine (SYNTHROID, LEVOTHROID) 50 MCG tablet Take 25 mcg by mouth Daily.     • lisinopril (PRINIVIL,ZESTRIL) 20 MG tablet Take 20 mg by mouth Daily.     • metFORMIN (GLUCOPHAGE) 500 MG tablet Take 500 mg by mouth 2 (Two) Times a Day With Meals.     • oxyCODONE-acetaminophen (PERCOCET) 5-325 MG per tablet Take 1-2 tablets by mouth Every 4 (Four) Hours As Needed (Pain). 10 tablet 0   • pantoprazole (PROTONIX) 40 MG EC tablet Take 40 mg by mouth Daily.     • simvastatin (ZOCOR) 40 MG tablet Take 40 mg by mouth Every Night.     • tamoxifen (NOLVADEX) 20 MG chemo tablet Take 1 tablet by mouth Daily. 90 tablet 3     No facility-administered encounter medications on file as of 2020.      ADULT ILLNESSES:  Patient Active Problem List   Diagnosis Code   • Ductal carcinoma in situ (DCIS) of right breast D05.11     SURGERIES:  Past Surgical History:   Procedure Laterality Date   • APPENDECTOMY     • BILATERAL BREAST REDUCTION     •  SECTION      X2   • CHOLECYSTECTOMY     • ERCP     • HERNIA REPAIR     • HYSTERECTOMY      PARTIAL   • KNEE ACL RECONSTRUCTION Right    • MASTECTOMY W/ SENTINEL NODE BIOPSY Right 2019    Procedure: RIGHT NEEDLE DIRECTED PARTIAL MASTECTOMY, MAMMO GUIDED AND  "SENTINEL LYMPH NODE BIOPSY, RADIOLOGIST TO INJECT AND SCAN;  Surgeon: Ana Muñoz MD;  Location: [facility] OR;  Service: General   • REDUCTION MAMMAPLASTY       HEALTH MAINTENANCE ITEMS:  Health Maintenance Due   Topic Date Due   • URINE MICROALBUMIN  1966   • ANNUAL PHYSICAL  05/28/1969   • PNEUMOCOCCAL VACCINE (19-64 MEDIUM RISK) (1 of 1 - PPSV23) 05/28/1985   • ZOSTER VACCINE (1 of 2) 05/28/2016   • HEPATITIS C SCREENING  07/17/2019   • DIABETIC FOOT EXAM  07/17/2019   • PAP SMEAR  07/17/2019   • LIPID PANEL  07/17/2019   • HEMOGLOBIN A1C  07/17/2019   • DIABETIC EYE EXAM  07/17/2019   • COLONOSCOPY  07/17/2019       <no information>  Last Completed Colonoscopy       Status Date      COLONOSCOPY No completions recorded          There is no immunization history on file for this patient.  Last Completed Mammogram       Status Date      MAMMOGRAM Done 7/11/2019 Ext Proc: HC MAMMOGRAM DIAGNOSTIC UNILAT DIGITAL W CAD     Patient has more history with this topic...            FAMILY HISTORY:  Family History   Problem Relation Age of Onset   • Breast cancer Neg Hx      SOCIAL HISTORY:  Social History     Socioeconomic History   • Marital status:      Spouse name: Not on file   • Number of children: Not on file   • Years of education: Not on file   • Highest education level: Not on file   Tobacco Use   • Smoking status: Current Every Day Smoker     Packs/day: 1.00   • Smokeless tobacco: Never Used   Substance and Sexual Activity   • Alcohol use: No     Frequency: Never   • Drug use: No   • Sexual activity: Defer       REVIEW OF SYSTEMS:    Review of Systems   Constitutional: Negative for chills, diaphoresis, fatigue and fever.        \"I feel great.\" Tolerating tamoxifen.   HENT: Negative for congestion, hearing loss and trouble swallowing.    Eyes: Negative for redness and visual disturbance.   Respiratory: Negative for cough, shortness of breath and wheezing.    Cardiovascular: Negative for chest pain " "and palpitations.   Gastrointestinal: Negative for abdominal pain, blood in stool, constipation, diarrhea, nausea and vomiting.   Endocrine: Negative for cold intolerance and heat intolerance.   Genitourinary: Negative for difficulty urinating, flank pain, hematuria and vaginal bleeding.   Musculoskeletal: Negative for gait problem and neck stiffness.   Skin: Negative for pallor.   Allergic/Immunologic: Negative for food allergies.   Neurological: Negative for dizziness, tremors, speech difficulty, weakness and light-headedness.   Hematological: Negative for adenopathy. Does not bruise/bleed easily.   Psychiatric/Behavioral: Negative for agitation, confusion and hallucinations. The patient is not nervous/anxious.        VITAL SIGNS: /66   Pulse 70   Temp 98 °F (36.7 °C)   Resp 18   Ht 165.1 cm (65\")   Wt 66.9 kg (147 lb 6.4 oz)   SpO2 98%   Breastfeeding No   BMI 24.53 kg/m²   Pain Score    05/29/20 0906   PainSc: 0-No pain       PHYSICAL EXAMINATION:     Physical Exam   Constitutional: She is oriented to person, place, and time. She appears well-developed and well-nourished. No distress.   HENT:   Head: Normocephalic and atraumatic.   Eyes: EOM are normal. No scleral icterus.   Cardiovascular: Normal rate and regular rhythm.   Pulmonary/Chest: Effort normal and breath sounds normal. She has no wheezes. She has no rales.   Lumpectomy scar. No sign of local recurrence. Examined with [place].   Abdominal: Soft. Bowel sounds are normal. There is no tenderness.   Musculoskeletal: She exhibits no edema.   Lymphadenopathy:     She has no cervical adenopathy.   Neurological: She is alert and oriented to person, place, and time.   Skin: Skin is warm and dry. She is not diaphoretic. No pallor.   Psychiatric: She has a normal mood and affect. Her behavior is normal. Judgment and thought content normal.   Vitals reviewed.      LABS    Lab Results - Last 18 Months   Lab Units 05/22/20  0952 09/17/19  1223 " 07/17/19  1653   HEMOGLOBIN g/dL 12.1 12.4 12.5   HEMATOCRIT % 36.1 38.1 38.5   MCV fL 86.6 88.2 88.1   WBC 10*3/mm3 5.22 6.16 8.23   RDW % 12.7 12.8 12.9   MPV fL 11.1 11.3 10.6   PLATELETS 10*3/mm3 250 312 323   IMM GRAN % % 0.2 0.2 0.4   NEUTROS ABS 10*3/mm3 2.83 2.57 3.78   LYMPHS ABS 10*3/mm3 1.68 2.81 3.52   MONOS ABS 10*3/mm3 0.43 0.48 0.56   EOS ABS 10*3/mm3 0.19 0.19 0.25   BASOS ABS 10*3/mm3 0.08 0.10 0.09   IMMATURE GRANS (ABS) 10*3/mm3 0.01 0.01 0.03   NRBC /100 WBC 0.0 0.0 0.0       Lab Results - Last 18 Months   Lab Units 05/22/20  0952 09/17/19  1223 07/17/19  1652   GLUCOSE mg/dL 221* 144* 109*   SODIUM mmol/L 137 142 140   POTASSIUM mmol/L 3.8 4.2 3.7   CO2 mmol/L 30.0* 33.0* 32.0*   CHLORIDE mmol/L 98 101 100   ANION GAP mmol/L 9.0 8.0 8.0   CREATININE mg/dL 0.81 1.08* 1.05   BUN mg/dL 12 16 16   BUN / CREAT RATIO  14.8 14.8 15.2   CALCIUM mg/dL 10.1 10.1 10.1   EGFR IF NONAFRICN AM mL/min/1.73 74 53* 55*   ALK PHOS U/L 41 41 49   TOTAL PROTEIN g/dL 7.2 7.8 8.0   ALT (SGPT) U/L 25 22 27   AST (SGOT) U/L 28 23 37   BILIRUBIN mg/dL 0.3 0.2 0.4   ALBUMIN g/dL 4.70 4.60 4.80   GLOBULIN gm/dL 2.5 3.2 3.2       No results for input(s): MSPIKE, KAPPALAMB, IGLFLC, URICACID, FREEKAPPAL, CEA, LDH, REFLABREPO in the last 11503 hours.    No results for input(s): IRON, TIBC, LABIRON, FERRITIN, D9LLLCI, TSH, FOLATE in the last 08423 hours.    Invalid input(s): VITB12    Ember Cedeno reports a pain score of 0.        Patient's Body mass index is 24.53 kg/m². BMI is within normal parameters. No follow-up required..    ASSESSMENT:  1.   Ductal arcinoma of the right breast, upper outer quadrant.  Current Stage: AJCC 0  (Tis, N0, M0, G2-3)   Tumor size 0.4 cm.  Hormone Status: % and CA 33%.  Baseline Node Status: 0/1 positive sentinel node.  Complications of Tumor: None.  Treatment status:Post adjuvant radiation.  On prophylactic tamoxifen.   Prognosis: Good.   2.   Performance status of 0.   3.    Hypertension. Stable.   4.   Type II diabetes.  5.   Hypercholesterolemia.         PLAN:  1.     Re:  Tolerance to prophylactic tamoxifen.  2.     Re:  Estrogen 100% and progesterone 33% on 09/18/2019.  3.     Re:  Heme status. Normal.   4.     Re:  Pre office CMP.  Glucose 221 mg/dl.  5.     Re:  Completion of adjuvant radiation.  6.    eRx for tamoxifen 20 mg p.o. daily, #90 with 3 refills [place], [place].  Monitor for potential cataract, thrombosis, and hot flashes.  7.    Continue currently identified medications.  8.    Continue ongoing management per primary care physician and other specialists.  9.    Plan of care discussed with patient.  Understanding expressed.  Patient agreeable to proceed.  10.  She will be seen every 6 months for 5 years.  Annual mammogram.   11.  Schedule next mammogram 07/2020.  12.  Return to office in 6 months with pre-office CBC with differential and CMP.  13.  Questions were answered to their satisfaction.      I spent 28 total minutes, face-to-face, caring for Ember today.  Greater than 50% of this time involved counseling and/or coordination of care as documented within this note regarding the patient's illness(es), pros and cons of various treatment options, instructions and/or risk reduction.         cc:  MD Pradeep Clayton MD Terry Boone, PA

## 2020-05-29 ENCOUNTER — OFFICE VISIT (OUTPATIENT)
Dept: ONCOLOGY | Facility: CLINIC | Age: 55
End: 2020-05-29

## 2020-05-29 VITALS
OXYGEN SATURATION: 98 % | TEMPERATURE: 98 F | SYSTOLIC BLOOD PRESSURE: 128 MMHG | BODY MASS INDEX: 24.56 KG/M2 | RESPIRATION RATE: 18 BRPM | HEART RATE: 70 BPM | WEIGHT: 147.4 LBS | HEIGHT: 65 IN | DIASTOLIC BLOOD PRESSURE: 66 MMHG

## 2020-05-29 DIAGNOSIS — D05.11 DUCTAL CARCINOMA IN SITU (DCIS) OF RIGHT BREAST: Primary | ICD-10-CM

## 2020-05-29 PROCEDURE — 99214 OFFICE O/P EST MOD 30 MIN: CPT | Performed by: INTERNAL MEDICINE

## 2020-06-26 ENCOUNTER — HOSPITAL ENCOUNTER (OUTPATIENT)
Dept: MAMMOGRAPHY | Facility: HOSPITAL | Age: 55
Discharge: HOME | End: 2020-06-26
Admitting: INTERNAL MEDICINE

## 2020-06-26 DIAGNOSIS — D05.11 DUCTAL CARCINOMA IN SITU (DCIS) OF RIGHT BREAST: ICD-10-CM

## 2020-06-26 PROCEDURE — G0279 TOMOSYNTHESIS, MAMMO: HCPCS

## 2020-06-26 PROCEDURE — 77066 DX MAMMO INCL CAD BI: CPT

## 2020-11-25 ENCOUNTER — APPOINTMENT (OUTPATIENT)
Dept: LAB | Facility: HOSPITAL | Age: 55
End: 2020-11-25

## 2020-11-25 ENCOUNTER — TELEPHONE (OUTPATIENT)
Dept: ONCOLOGY | Facility: CLINIC | Age: 55
End: 2020-11-25

## 2020-11-25 NOTE — TELEPHONE ENCOUNTER
Caller: FREDDY ALFARO    Relationship to patient: SELF  Best call back number: [phone]    PT LAB APPT CANCELATION FOR 11/25 @11AM DUE TO DAUGHTER TESTING POSITIVE FOR COVID 19.    PT WILL CALL BACK TO RESCHEDULE.

## 2021-03-08 ENCOUNTER — TELEPHONE (OUTPATIENT)
Dept: ONCOLOGY | Facility: CLINIC | Age: 56
End: 2021-03-08

## 2021-03-08 RX ORDER — TAMOXIFEN CITRATE 20 MG/1
20 TABLET ORAL DAILY
Qty: 90 TABLET | Refills: 3 | Status: SHIPPED | OUTPATIENT
Start: 2021-03-08

## 2021-03-08 NOTE — TELEPHONE ENCOUNTER
Caller: Ember    Relationship: Pt    Best call back number: [phone]     Medication needed:   Requested Prescriptions     Pending Prescriptions Disp Refills   • tamoxifen (NOLVADEX) 20 MG chemo tablet 90 tablet 3     Sig: Take 1 tablet by mouth Daily.       When do you need the refill by: Whenever possible    What details did the patient provide when requesting the medication: Pt is out of medication, she only had a 6 month supply. She wasn't sure if she'd need to see Dr Motley before it could be refilled    Does the patient have less than a 3 day supply:  [x] Yes  [] No    What is the patient's preferred pharmacy:    [facility] [address] [phone] [facility] [phone] [place]

## 2021-03-08 NOTE — TELEPHONE ENCOUNTER
Caller: Ember     Relationship to patient: Pt    Best call back number: [phone]     Type of visit: Lab and follow up     Requested date: Next available appt, as late in the day as possible     If rescheduling, when is the original appointment: 11/25/20 and 12/02/20

## 2021-03-08 NOTE — TELEPHONE ENCOUNTER
Spoke w/pt and have r/s her appt w/Dr Motley 3/30/21. Pt requests to do lab at [facility] and I will fax orders. Also sent msg to Jeannine that she needs refill of Tamoxifen.

## 2021-03-10 ENCOUNTER — TELEPHONE (OUTPATIENT)
Dept: ONCOLOGY | Facility: CLINIC | Age: 56
End: 2021-03-10

## 2021-03-10 DIAGNOSIS — D05.11 DUCTAL CARCINOMA IN SITU (DCIS) OF RIGHT BREAST: Primary | ICD-10-CM

## 2021-03-10 NOTE — TELEPHONE ENCOUNTER
----- Message from Lisa Ramirez sent at 3/8/2021 12:09 PM CST -----  Please enter lab orders. I will fax to [facility] Strickland: pre-office CBC with differential and CMP.  Also pt needs refill of Tamoxifen sent to [facility] in [place]. She had to cx her last fol up due to Covid. I have r/s her to 3/30/21.

## 2021-03-23 NOTE — PROGRESS NOTES
[facility]  [address]  [place] [place] [address] [phone]    Patient Name: Ember Cedeno  Encounter Date: 03/30/2021  YOB: 1965  Patient Number: [id number]      REASON FOR FOLLOW-UP: Ember Cedeno is a pleasant 55 y.o.  female who is seen on follow-up for ductal carcinoma in situ right breast.  She is on prophylactic tamoxifen, almost 17 months. . She is also seen for normocytic anemia, ? Chronic kidney disease stage III.  She is seen alone. History is obtained from patient. History is considered to be accurate.        Oncology/Hematology History Overview Note   DIAGNOSTIC ABNORMALITIES:  She had undergone bilateral screening mammogram on 07/01/2019 at [facility].  New group of suspicious calcification right breast upper outer quadrant.    The patient was seen by Dr. Ana Muñoz 07/08/2019.  She was scheduled for right stereotactic biopsy.   Biopsy report 07/12/2019 ductal carcinoma in situ favor high-grade.  Largest focus of in situ carcinoma measures 0.4 cm.  In situ carcinoma present in multiple cores.  Patient was seen by Dr. Muñoz on follow-up 07/17/2019.  Plan for partial mastectomy with sentinel lymph node biopsy.  Pathology report 07/22/2019 showed sentinel lymph node negative for tumor.  Ductal carcinoma in situ grade 2-3 with focal comedo necrosis.  DCIS is close to apparent resection margins in multiple locations focally less than 1 mm.        PREVIOUS INTERVENTIONS:  She had undergone right needle directed partial mastectomy with sentinel lymph node biopsy by Dr. Ana Muñoz 07/19/2019.  Adjuvant radiation completed 10/31/2019  Prophylactic tamoxifen 11/01/2019 through present.       Ductal carcinoma in situ (DCIS) of right breast   9/17/2019 Initial Diagnosis    Ductal carcinoma in situ (DCIS) of right breast     5/22/2020 Cancer Staged    Staging form: Breast, AJCC  8th Edition  - Pathologic stage from 2020: Stage 0 (pTis (DCIS), pN0, cM0, G3, ER+, CT+, HER2: Not Assessed) - Signed by Vinicius Motley MD on 2020         PAST MEDICAL HISTORY:  ALLERGIES:  No Known Allergies  CURRENT MEDICATIONS:  Outpatient Encounter Medications as of 3/30/2021   Medication Sig Dispense Refill   • buprenorphine-naloxone (SUBOXONE) 8-2 MG per SL tablet Place 1 tablet under the tongue Daily.     • fenofibrate 160 MG tablet Take 160 mg by mouth Daily.     • FLUoxetine (PROzac) 20 MG capsule Take 40 mg by mouth Daily.     • gabapentin (NEURONTIN) 800 MG tablet Take 800 mg by mouth 4 (Four) Times a Day.     • hydrochlorothiazide (HYDRODIURIL) 25 MG tablet Take 25 mg by mouth Daily.     • levothyroxine (SYNTHROID, LEVOTHROID) 50 MCG tablet Take 25 mcg by mouth Daily.     • lisinopril (PRINIVIL,ZESTRIL) 20 MG tablet Take 20 mg by mouth Daily.     • metFORMIN (GLUCOPHAGE) 500 MG tablet Take 500 mg by mouth 2 (Two) Times a Day With Meals.     • simvastatin (ZOCOR) 40 MG tablet Take 40 mg by mouth Every Night.     • tamoxifen (NOLVADEX) 20 MG chemo tablet Take 1 tablet by mouth Daily. 90 tablet 3   • escitalopram (LEXAPRO) 20 MG tablet Take 20 mg by mouth Daily.     • glipiZIDE (GLUCOTROL) 10 MG tablet Take 10 mg by mouth 2 (Two) Times a Day Before Meals.     • oxyCODONE-acetaminophen (PERCOCET) 5-325 MG per tablet Take 1-2 tablets by mouth Every 4 (Four) Hours As Needed (Pain). 10 tablet 0   • pantoprazole (PROTONIX) 40 MG EC tablet Take 40 mg by mouth Daily.       No facility-administered encounter medications on file as of 3/30/2021.     ADULT ILLNESSES:  Patient Active Problem List   Diagnosis Code   • Ductal carcinoma in situ (DCIS) of right breast D05.11     SURGERIES:  Past Surgical History:   Procedure Laterality Date   • APPENDECTOMY     • BILATERAL BREAST REDUCTION     • BREAST BIOPSY     • BREAST LUMPECTOMY     •  SECTION      X2   • CHOLECYSTECTOMY     • ERCP     • HERNIA  REPAIR     • HYSTERECTOMY      PARTIAL   • KNEE ACL RECONSTRUCTION Right    • MASTECTOMY W/ SENTINEL NODE BIOPSY Right 7/19/2019    Procedure: RIGHT NEEDLE DIRECTED PARTIAL MASTECTOMY, MAMMO GUIDED AND SENTINEL LYMPH NODE BIOPSY, RADIOLOGIST TO INJECT AND SCAN;  Surgeon: Ana Muñoz MD;  Location: [facility];  Service: General   • OOPHORECTOMY     • REDUCTION MAMMAPLASTY       HEALTH MAINTENANCE ITEMS:  Health Maintenance Due   Topic Date Due   • URINE MICROALBUMIN  Never done   • COLONOSCOPY  Never done   • ANNUAL PHYSICAL  Never done   • Pneumococcal Vaccine 0-64 (1 of 1 - PPSV23) Never done   • ZOSTER VACCINE (1 of 2) Never done   • HEPATITIS C SCREENING  Never done   • DIABETIC FOOT EXAM  Never done   • PAP SMEAR  Never done   • LIPID PANEL  Never done   • HEMOGLOBIN A1C  Never done   • DIABETIC EYE EXAM  Never done   • INFLUENZA VACCINE  Never done       <no information>  Last Completed Colonoscopy       Status Date      COLONOSCOPY No completions recorded          There is no immunization history on file for this patient.  Last Completed Mammogram       Status Date      MAMMOGRAM Done 7/11/2019 Ext Proc: HC MAMMOGRAM DIAGNOSTIC UNILAT DIGITAL W CAD     Patient has more history with this topic...            FAMILY HISTORY:  Family History   Problem Relation Age of Onset   • No Known Problems Mother    • No Known Problems Father    • No Known Problems Sister    • No Known Problems Brother    • No Known Problems Daughter    • No Known Problems Son    • No Known Problems Maternal Grandmother    • No Known Problems Paternal Grandmother    • No Known Problems Maternal Aunt    • No Known Problems Paternal Aunt    • No Known Problems Other    • Breast cancer Neg Hx    • BRCA 1/2 Neg Hx    • Colon cancer Neg Hx    • Endometrial cancer Neg Hx    • Ovarian cancer Neg Hx      SOCIAL HISTORY:  Social History     Socioeconomic History   • Marital status:      Spouse name: Not on file   • Number of children: Not  "on file   • Years of education: Not on file   • Highest education level: Not on file   Tobacco Use   • Smoking status: Current Every Day Smoker     Packs/day: 1.00   • Smokeless tobacco: Never Used   Substance and Sexual Activity   • Alcohol use: No   • Drug use: No   • Sexual activity: Defer       REVIEW OF SYSTEMS:    Review of Systems   Constitutional: Positive for fatigue. Negative for chills and fever.        \"I feel okay.\"   HENT: Negative for congestion and trouble swallowing.    Eyes: Negative for redness and visual disturbance.   Respiratory: Negative for cough, shortness of breath and wheezing.    Cardiovascular: Negative for chest pain and palpitations.   Gastrointestinal: Negative for abdominal pain, blood in stool, constipation, diarrhea, nausea and vomiting.   Endocrine: Negative for cold intolerance and heat intolerance.   Genitourinary: Negative for difficulty urinating, flank pain, hematuria and vaginal bleeding.   Musculoskeletal: Negative for gait problem and joint swelling.   Skin: Positive for pallor.   Allergic/Immunologic: Negative for food allergies.   Neurological: Negative for dizziness and speech difficulty.   Hematological: Negative for adenopathy. Does not bruise/bleed easily.   Psychiatric/Behavioral: Negative for agitation, confusion and hallucinations.       VITAL SIGNS: /64   Pulse 82   Temp 98.6 °F (37 °C)   Resp 18   Ht 165.1 cm (65\")   Wt 63.2 kg (139 lb 4.8 oz)   SpO2 98%   Breastfeeding No   BMI 23.18 kg/m²   Pain Score    03/30/21 1309   PainSc: 0-No pain       PHYSICAL EXAMINATION:     Physical Exam  Vitals reviewed.   Constitutional:       General: She is not in acute distress.  HENT:      Head: Normocephalic and atraumatic.   Eyes:      General: No scleral icterus.  Cardiovascular:      Rate and Rhythm: Normal rate and regular rhythm.   Pulmonary:      Effort: No respiratory distress.      Breath sounds: No wheezing or rales.      Comments: Breasts exam per " PCP.   Abdominal:      General: Abdomen is flat. Bowel sounds are normal.      Palpations: Abdomen is soft.      Tenderness: There is no abdominal tenderness.   Musculoskeletal:         General: No swelling.      Cervical back: Neck supple.   Skin:     General: Skin is warm and dry.      Coloration: Skin is pale.   Neurological:      Mental Status: She is alert and oriented to person, place, and time.   Psychiatric:         Mood and Affect: Mood normal.         Behavior: Behavior normal.         Thought Content: Thought content normal.         Judgment: Judgment normal.         LABS    Lab Results - Last 18 Months   Lab Units 05/22/20  0952   HEMOGLOBIN g/dL 12.1   HEMATOCRIT % 36.1   MCV fL 86.6   WBC 10*3/mm3 5.22   RDW % 12.7   MPV fL 11.1   PLATELETS 10*3/mm3 250   IMM GRAN % % 0.2   NEUTROS ABS 10*3/mm3 2.83   LYMPHS ABS 10*3/mm3 1.68   MONOS ABS 10*3/mm3 0.43   EOS ABS 10*3/mm3 0.19   BASOS ABS 10*3/mm3 0.08   IMMATURE GRANS (ABS) 10*3/mm3 0.01   NRBC /100 WBC 0.0       Lab Results - Last 18 Months   Lab Units 05/22/20  0952   GLUCOSE mg/dL 221*   SODIUM mmol/L 137   POTASSIUM mmol/L 3.8   CO2 mmol/L 30.0*   CHLORIDE mmol/L 98   ANION GAP mmol/L 9.0   CREATININE mg/dL 0.81   BUN mg/dL 12   BUN / CREAT RATIO  14.8   CALCIUM mg/dL 10.1   EGFR IF NONAFRICN AM mL/min/1.73 74   ALK PHOS U/L 41   TOTAL PROTEIN g/dL 7.2   ALT (SGPT) U/L 25   AST (SGOT) U/L 28   BILIRUBIN mg/dL 0.3   ALBUMIN g/dL 4.70   GLOBULIN gm/dL 2.5       No results for input(s): MSPIKE, KAPPALAMB, IGLFLC, URICACID, FREEKAPPAL, CEA, LDH, REFLABREPO in the last 24705 hours.    No results for input(s): IRON, TIBC, LABIRON, FERRITIN, F4VZEGY, TSH, FOLATE in the last 71817 hours.    Invalid input(s): VITB12    Ember Cedeno reports a pain score of 0.      Patient's Body mass index is 23.18 kg/m². BMI is within normal parameters. No follow-up required..      ASSESSMENT:  1.   Ductal arcinoma of the right breast, upper outer quadrant.  Current Stage:  "AJCC 0  (Tis, N0, M0, G2-3)   Tumor size 0.4 cm.  Hormone Status: % and ID 33%.  Baseline Node Status: 0/1 positive sentinel node.  Complications of Tumor: None.  Treatment status:Post adjuvant radiation.  On prophylactic tamoxifen.   Prognosis: Good.   2.   Performance status of 0.   3.   Hypertension.  On lisinopril.  4.   Type II diabetes.  On Metformin.  5.   Hypercholesterolemia.  On simvastatin.  6.   Normocytic anemia likely from chronic kidney disease stage III, GFR 58 mL/min on 03/25/2021.       PLAN:  1.     Re:  Tolerating prophylactic tamoxifen.  2.     Re:  Heme status.  Hemoglobin 10.4 and hematocrit 33.2.  3.     Re:  Pre office CMP.  GFR 58 mL/min.  4.     Re:  Continue prophylactic tamoxifen.  5.     Re:  Mammogram report 06/26/2020.  No mammographic evidence of malignancy.  6.    Continue ongoing management per primary care physician and other specialists.  7.    Plan of care discussed with patient.  Understanding expressed.  Patient agreeable to proceed.  8.    She will be seen every 6 months for 5 years.  Annual mammogram.   9.    Schedule next mammogram 06/2021.  10.  eRx for tamoxifen 20 mg p.o. daily, #90 with 3 refills [place], [place].  Monitor for potential cataract, thrombosis, and hot flashes.  11.  Blood for ferritin, iron panel, reticulocyte, B12, folate, EPO and SPEP today for anemia  12.  Questions were answered to their satisfaction. \"Okay.\"  13.  Stool for occult blood times 3.   14.  CBC with differential, ferritin, iron panel and CMP in 3 months.  15.  Return to office in 6 months with pre-office CBC with differential, ferritin, iron panel and CMP.      I have reviewed the assessment and plan and verified the accuracy of it. No changes to assessment and plan since the information was documented. Vinicius Motley MD 03/30/21       I spent 32 total minutes, face-to-face, caring for Ember today.  Greater than 50% of this time involved counseling " and/or coordination of care as documented within this note regarding the patient's illness(es), pros and cons of various treatment options, instructions and/or risk reduction.           cc:  (Ana Muñoz MD)         (Pradeep Jo MD)         MURIEL Arceo

## 2021-03-30 ENCOUNTER — LAB (OUTPATIENT)
Dept: LAB | Facility: HOSPITAL | Age: 56
End: 2021-03-30

## 2021-03-30 ENCOUNTER — OFFICE VISIT (OUTPATIENT)
Dept: ONCOLOGY | Facility: CLINIC | Age: 56
End: 2021-03-30

## 2021-03-30 VITALS
SYSTOLIC BLOOD PRESSURE: 120 MMHG | HEART RATE: 82 BPM | DIASTOLIC BLOOD PRESSURE: 64 MMHG | OXYGEN SATURATION: 98 % | HEIGHT: 65 IN | BODY MASS INDEX: 23.21 KG/M2 | TEMPERATURE: 98.6 F | RESPIRATION RATE: 18 BRPM | WEIGHT: 139.3 LBS

## 2021-03-30 DIAGNOSIS — D63.1 ANEMIA DUE TO STAGE 3A CHRONIC KIDNEY DISEASE (HCC): ICD-10-CM

## 2021-03-30 DIAGNOSIS — D05.11 DUCTAL CARCINOMA IN SITU (DCIS) OF RIGHT BREAST: Primary | ICD-10-CM

## 2021-03-30 DIAGNOSIS — N18.31 ANEMIA DUE TO STAGE 3A CHRONIC KIDNEY DISEASE (HCC): ICD-10-CM

## 2021-03-30 DIAGNOSIS — D05.11 DUCTAL CARCINOMA IN SITU (DCIS) OF RIGHT BREAST: ICD-10-CM

## 2021-03-30 LAB
ALBUMIN SERPL-MCNC: 4.8 G/DL (ref 3.5–5.2)
ALBUMIN/GLOB SERPL: 1.6 G/DL
ALP SERPL-CCNC: 38 U/L (ref 39–117)
ALT SERPL W P-5'-P-CCNC: 17 U/L (ref 1–33)
ANION GAP SERPL CALCULATED.3IONS-SCNC: 9 MMOL/L (ref 5–15)
AST SERPL-CCNC: 25 U/L (ref 1–32)
BASOPHILS # BLD AUTO: 0.09 10*3/MM3 (ref 0–0.2)
BASOPHILS NFR BLD AUTO: 1.3 % (ref 0–1.5)
BILIRUB SERPL-MCNC: 0.3 MG/DL (ref 0–1.2)
BUN SERPL-MCNC: 15 MG/DL (ref 6–20)
BUN/CREAT SERPL: 18.5 (ref 7–25)
CALCIUM SPEC-SCNC: 10.1 MG/DL (ref 8.6–10.5)
CHLORIDE SERPL-SCNC: 98 MMOL/L (ref 98–107)
CO2 SERPL-SCNC: 31 MMOL/L (ref 22–29)
CREAT SERPL-MCNC: 0.81 MG/DL (ref 0.57–1)
DEPRECATED RDW RBC AUTO: 39.4 FL (ref 37–54)
EOSINOPHIL # BLD AUTO: 0.14 10*3/MM3 (ref 0–0.4)
EOSINOPHIL NFR BLD AUTO: 2 % (ref 0.3–6.2)
ERYTHROCYTE [DISTWIDTH] IN BLOOD BY AUTOMATED COUNT: 12.2 % (ref 12.3–15.4)
FERRITIN SERPL-MCNC: 86.38 NG/ML (ref 13–150)
GFR SERPL CREATININE-BSD FRML MDRD: 74 ML/MIN/1.73
GLOBULIN UR ELPH-MCNC: 3 GM/DL
GLUCOSE SERPL-MCNC: 116 MG/DL (ref 65–99)
HCT VFR BLD AUTO: 33.2 % (ref 34–46.6)
HGB BLD-MCNC: 11.1 G/DL (ref 12–15.9)
IMM GRANULOCYTES # BLD AUTO: 0.02 10*3/MM3 (ref 0–0.05)
IMM GRANULOCYTES NFR BLD AUTO: 0.3 % (ref 0–0.5)
IRON 24H UR-MRATE: 59 MCG/DL (ref 37–145)
IRON SATN MFR SERPL: 13 % (ref 20–50)
LYMPHOCYTES # BLD AUTO: 2.66 10*3/MM3 (ref 0.7–3.1)
LYMPHOCYTES NFR BLD AUTO: 38.7 % (ref 19.6–45.3)
MCH RBC QN AUTO: 29.4 PG (ref 26.6–33)
MCHC RBC AUTO-ENTMCNC: 33.4 G/DL (ref 31.5–35.7)
MCV RBC AUTO: 87.8 FL (ref 79–97)
MONOCYTES # BLD AUTO: 0.41 10*3/MM3 (ref 0.1–0.9)
MONOCYTES NFR BLD AUTO: 6 % (ref 5–12)
NEUTROPHILS NFR BLD AUTO: 3.56 10*3/MM3 (ref 1.7–7)
NEUTROPHILS NFR BLD AUTO: 51.7 % (ref 42.7–76)
NRBC BLD AUTO-RTO: 0 /100 WBC (ref 0–0.2)
PLATELET # BLD AUTO: 230 10*3/MM3 (ref 140–450)
PMV BLD AUTO: 11.3 FL (ref 6–12)
POTASSIUM SERPL-SCNC: 3.9 MMOL/L (ref 3.5–5.2)
PROT SERPL-MCNC: 7.8 G/DL (ref 6–8.5)
RBC # BLD AUTO: 3.78 10*6/MM3 (ref 3.77–5.28)
RETICS # AUTO: 0.04 10*6/MM3 (ref 0.02–0.13)
RETICS/RBC NFR AUTO: 1.15 % (ref 0.7–1.9)
SODIUM SERPL-SCNC: 138 MMOL/L (ref 136–145)
TIBC SERPL-MCNC: 459 MCG/DL (ref 298–536)
TRANSFERRIN SERPL-MCNC: 308 MG/DL (ref 200–360)
WBC # BLD AUTO: 6.88 10*3/MM3 (ref 3.4–10.8)

## 2021-03-30 PROCEDURE — 83540 ASSAY OF IRON: CPT | Performed by: INTERNAL MEDICINE

## 2021-03-30 PROCEDURE — 84466 ASSAY OF TRANSFERRIN: CPT | Performed by: INTERNAL MEDICINE

## 2021-03-30 PROCEDURE — 36415 COLL VENOUS BLD VENIPUNCTURE: CPT | Performed by: INTERNAL MEDICINE

## 2021-03-30 PROCEDURE — 84165 PROTEIN E-PHORESIS SERUM: CPT | Performed by: INTERNAL MEDICINE

## 2021-03-30 PROCEDURE — 80053 COMPREHEN METABOLIC PANEL: CPT

## 2021-03-30 PROCEDURE — 82746 ASSAY OF FOLIC ACID SERUM: CPT | Performed by: INTERNAL MEDICINE

## 2021-03-30 PROCEDURE — 99214 OFFICE O/P EST MOD 30 MIN: CPT | Performed by: INTERNAL MEDICINE

## 2021-03-30 PROCEDURE — 85045 AUTOMATED RETICULOCYTE COUNT: CPT | Performed by: INTERNAL MEDICINE

## 2021-03-30 PROCEDURE — 82668 ASSAY OF ERYTHROPOIETIN: CPT | Performed by: INTERNAL MEDICINE

## 2021-03-30 PROCEDURE — 82607 VITAMIN B-12: CPT | Performed by: INTERNAL MEDICINE

## 2021-03-30 PROCEDURE — 82728 ASSAY OF FERRITIN: CPT | Performed by: INTERNAL MEDICINE

## 2021-03-30 PROCEDURE — 85025 COMPLETE CBC W/AUTO DIFF WBC: CPT

## 2021-03-30 RX ORDER — TAMOXIFEN CITRATE 20 MG/1
20 TABLET ORAL DAILY
Qty: 90 TABLET | Refills: 3 | Status: SHIPPED | OUTPATIENT
Start: 2021-03-30

## 2021-03-30 RX ORDER — FLUOXETINE HYDROCHLORIDE 20 MG/1
40 CAPSULE ORAL DAILY
COMMUNITY
Start: 2021-02-08

## 2021-03-30 RX ORDER — FERROUS SULFATE 325(65) MG
325 TABLET ORAL
Qty: 60 TABLET | Refills: 2 | Status: SHIPPED | OUTPATIENT
Start: 2021-03-30

## 2021-03-30 NOTE — TELEPHONE ENCOUNTER
----- Message from Vinicius Motley MD sent at 3/30/2021  2:34 PM CDT -----  ERx Ferrous sulfate 325 mg p.o. daily #60 with 2 refills.  Stop and call if intolerant.

## 2021-03-30 NOTE — TELEPHONE ENCOUNTER
Called Ember about her iron level being low and Dr. Motley wants her to start an iron supplement daily.  Instructed on possible side effects stomach upset, diarrhea, and constipation and for her to stop the iron and call the office if she does have any problems.

## 2021-03-31 LAB
ALBUMIN SERPL ELPH-MCNC: 4.2 G/DL (ref 2.9–4.4)
ALBUMIN/GLOB SERPL: 1.4 {RATIO} (ref 0.7–1.7)
ALPHA1 GLOB SERPL ELPH-MCNC: 0.3 G/DL (ref 0–0.4)
ALPHA2 GLOB SERPL ELPH-MCNC: 0.7 G/DL (ref 0.4–1)
B-GLOBULIN SERPL ELPH-MCNC: 1 G/DL (ref 0.7–1.3)
EPO SERPL-ACNC: 6.3 MIU/ML (ref 2.6–18.5)
FOLATE SERPL-MCNC: 17.3 NG/ML (ref 4.78–24.2)
GAMMA GLOB SERPL ELPH-MCNC: 1.1 G/DL (ref 0.4–1.8)
GLOBULIN SER CALC-MCNC: 3.1 G/DL (ref 2.2–3.9)
LABORATORY COMMENT REPORT: NORMAL
M PROTEIN SERPL ELPH-MCNC: NORMAL G/DL
PROT PATTERN SERPL ELPH-IMP: NORMAL
PROT SERPL-MCNC: 7.3 G/DL (ref 6–8.5)
VIT B12 BLD-MCNC: 526 PG/ML (ref 211–946)

## 2021-06-28 ENCOUNTER — LAB (OUTPATIENT)
Dept: LAB | Facility: HOSPITAL | Age: 56
End: 2021-06-28

## 2021-06-28 ENCOUNTER — HOSPITAL ENCOUNTER (OUTPATIENT)
Dept: MAMMOGRAPHY | Facility: HOSPITAL | Age: 56
Discharge: HOME | End: 2021-06-28

## 2021-06-28 DIAGNOSIS — N18.31 ANEMIA DUE TO STAGE 3A CHRONIC KIDNEY DISEASE (HCC): ICD-10-CM

## 2021-06-28 DIAGNOSIS — D05.11 DUCTAL CARCINOMA IN SITU (DCIS) OF RIGHT BREAST: ICD-10-CM

## 2021-06-28 DIAGNOSIS — D63.1 ANEMIA DUE TO STAGE 3A CHRONIC KIDNEY DISEASE (HCC): ICD-10-CM

## 2021-06-28 LAB
ALBUMIN SERPL-MCNC: 4.4 G/DL (ref 3.5–5.2)
ALBUMIN/GLOB SERPL: 1.6 G/DL
ALP SERPL-CCNC: 42 U/L (ref 39–117)
ALT SERPL W P-5'-P-CCNC: 16 U/L (ref 1–33)
ANION GAP SERPL CALCULATED.3IONS-SCNC: 8 MMOL/L (ref 5–15)
AST SERPL-CCNC: 22 U/L (ref 1–32)
BASOPHILS # BLD AUTO: 0.1 10*3/MM3 (ref 0–0.2)
BASOPHILS NFR BLD AUTO: 0.8 % (ref 0–1.5)
BILIRUB SERPL-MCNC: 0.2 MG/DL (ref 0–1.2)
BUN SERPL-MCNC: 12 MG/DL (ref 6–20)
BUN/CREAT SERPL: 16.2 (ref 7–25)
CALCIUM SPEC-SCNC: 9.7 MG/DL (ref 8.6–10.5)
CHLORIDE SERPL-SCNC: 102 MMOL/L (ref 98–107)
CO2 SERPL-SCNC: 31 MMOL/L (ref 22–29)
CREAT SERPL-MCNC: 0.74 MG/DL (ref 0.57–1)
DEPRECATED RDW RBC AUTO: 39.4 FL (ref 37–54)
EOSINOPHIL # BLD AUTO: 0.35 10*3/MM3 (ref 0–0.4)
EOSINOPHIL NFR BLD AUTO: 2.8 % (ref 0.3–6.2)
ERYTHROCYTE [DISTWIDTH] IN BLOOD BY AUTOMATED COUNT: 12.1 % (ref 12.3–15.4)
FERRITIN SERPL-MCNC: 60.15 NG/ML (ref 13–150)
GFR SERPL CREATININE-BSD FRML MDRD: 81 ML/MIN/1.73
GLOBULIN UR ELPH-MCNC: 2.8 GM/DL
GLUCOSE SERPL-MCNC: 127 MG/DL (ref 65–99)
HCT VFR BLD AUTO: 36.5 % (ref 34–46.6)
HGB BLD-MCNC: 12 G/DL (ref 12–15.9)
IMM GRANULOCYTES # BLD AUTO: 0.03 10*3/MM3 (ref 0–0.05)
IMM GRANULOCYTES NFR BLD AUTO: 0.2 % (ref 0–0.5)
IRON 24H UR-MRATE: 79 MCG/DL (ref 37–145)
IRON SATN MFR SERPL: 19 % (ref 20–50)
LYMPHOCYTES # BLD AUTO: 2.18 10*3/MM3 (ref 0.7–3.1)
LYMPHOCYTES NFR BLD AUTO: 17.7 % (ref 19.6–45.3)
MCH RBC QN AUTO: 29.3 PG (ref 26.6–33)
MCHC RBC AUTO-ENTMCNC: 32.9 G/DL (ref 31.5–35.7)
MCV RBC AUTO: 89 FL (ref 79–97)
MONOCYTES # BLD AUTO: 0.7 10*3/MM3 (ref 0.1–0.9)
MONOCYTES NFR BLD AUTO: 5.7 % (ref 5–12)
NEUTROPHILS NFR BLD AUTO: 72.8 % (ref 42.7–76)
NEUTROPHILS NFR BLD AUTO: 8.94 10*3/MM3 (ref 1.7–7)
NRBC BLD AUTO-RTO: 0 /100 WBC (ref 0–0.2)
PLATELET # BLD AUTO: 222 10*3/MM3 (ref 140–450)
PMV BLD AUTO: 11.1 FL (ref 6–12)
POTASSIUM SERPL-SCNC: 4 MMOL/L (ref 3.5–5.2)
PROT SERPL-MCNC: 7.2 G/DL (ref 6–8.5)
RBC # BLD AUTO: 4.1 10*6/MM3 (ref 3.77–5.28)
SODIUM SERPL-SCNC: 141 MMOL/L (ref 136–145)
TIBC SERPL-MCNC: 419 MCG/DL (ref 298–536)
TRANSFERRIN SERPL-MCNC: 281 MG/DL (ref 200–360)
WBC # BLD AUTO: 12.3 10*3/MM3 (ref 3.4–10.8)

## 2021-06-28 PROCEDURE — 83540 ASSAY OF IRON: CPT

## 2021-06-28 PROCEDURE — 77066 DX MAMMO INCL CAD BI: CPT

## 2021-06-28 PROCEDURE — 80053 COMPREHEN METABOLIC PANEL: CPT

## 2021-06-28 PROCEDURE — 82728 ASSAY OF FERRITIN: CPT

## 2021-06-28 PROCEDURE — 84466 ASSAY OF TRANSFERRIN: CPT

## 2021-06-28 PROCEDURE — G0279 TOMOSYNTHESIS, MAMMO: HCPCS

## 2021-06-28 PROCEDURE — 85025 COMPLETE CBC W/AUTO DIFF WBC: CPT

## 2021-06-28 PROCEDURE — 36415 COLL VENOUS BLD VENIPUNCTURE: CPT

## 2021-09-19 NOTE — PROGRESS NOTES
[facility]EMATOLOGY AND ONCOLOGY  [address]  [facility] [address] [phone]    Patient Name: Ember Cedeno  Encounter Date: 09/30/2021  YOB: 1965  Patient Number: [id number]      REASON FOR FOLLOW-UP: Ember Cedeno is a pleasant 56 y.o.  female who is seen on follow-up for ductal carcinoma in situ right breast.  She is on prophylactic tamoxifen, almost 23 months. She is also seen for normocytic anemia from iron deficiency and ? Chronic kidney disease stage III. She is on oral iron for the past 6 months. She is seen alone.  History is obtained from patient. History is considered to be accurate.         Oncology/Hematology History Overview Note   DIAGNOSTIC ABNORMALITIES:  She had undergone bilateral screening mammogram on 07/01/2019 at [facility].  New group of suspicious calcification right breast upper outer quadrant.    The patient was seen by Dr. Ana Mñuoz 07/08/2019.  She was scheduled for right stereotactic biopsy.   Biopsy report 07/12/2019 ductal carcinoma in situ favor high-grade.  Largest focus of in situ carcinoma measures 0.4 cm.  In situ carcinoma present in multiple cores.  Patient was seen by Dr. Muñoz on follow-up 07/17/2019.  Plan for partial mastectomy with sentinel lymph node biopsy.  Pathology report 07/22/2019 showed sentinel lymph node negative for tumor.  Ductal carcinoma in situ grade 2-3 with focal comedo necrosis.  DCIS is close to apparent resection margins in multiple locations focally less than 1 mm.        PREVIOUS INTERVENTIONS:  She had undergone right needle directed partial mastectomy with sentinel lymph node biopsy by Dr. Ana Muñoz 07/19/2019.  Adjuvant radiation completed 10/31/2019  Prophylactic tamoxifen 11/01/2019 through present.        PREVIOUS INTERVENTIONS:Anemia.  Ferrous sulfate 93/30/2021 through present.       Ductal carcinoma in situ (DCIS) of  right breast   9/17/2019 Initial Diagnosis    Ductal carcinoma in situ (DCIS) of right breast     5/22/2020 Cancer Staged    Staging form: Breast, AJCC 8th Edition  - Pathologic stage from 5/22/2020: Stage 0 (pTis (DCIS), pN0, cM0, G3, ER+, NM+, HER2: Not Assessed) - Signed by Vinicius Motley MD on 5/22/2020         PAST MEDICAL HISTORY:  ALLERGIES:  No Known Allergies  CURRENT MEDICATIONS:  Outpatient Encounter Medications as of 9/30/2021   Medication Sig Dispense Refill   • buprenorphine-naloxone (SUBOXONE) 8-2 MG per SL tablet Place 1 tablet under the tongue Daily.     • fenofibrate 160 MG tablet Take 160 mg by mouth Daily.     • ferrous sulfate 325 (65 FE) MG tablet Take 1 tablet by mouth Daily With Breakfast. 60 tablet 2   • FLUoxetine (PROzac) 20 MG capsule Take 40 mg by mouth Daily.     • gabapentin (NEURONTIN) 800 MG tablet Take 800 mg by mouth 4 (Four) Times a Day.     • hydrochlorothiazide (HYDRODIURIL) 25 MG tablet Take 25 mg by mouth Daily.     • levothyroxine (SYNTHROID, LEVOTHROID) 50 MCG tablet Take 25 mcg by mouth Daily.     • lisinopril (PRINIVIL,ZESTRIL) 20 MG tablet Take 20 mg by mouth Daily.     • metFORMIN (GLUCOPHAGE) 500 MG tablet Take 500 mg by mouth 2 (Two) Times a Day With Meals.     • oxyCODONE-acetaminophen (PERCOCET) 5-325 MG per tablet Take 1-2 tablets by mouth Every 4 (Four) Hours As Needed (Pain). 10 tablet 0   • simvastatin (ZOCOR) 40 MG tablet Take 40 mg by mouth Every Night.     • tamoxifen (NOLVADEX) 20 MG chemo tablet Take 1 tablet by mouth Daily. 90 tablet 3   • tamoxifen (NOLVADEX) 20 MG chemo tablet Take 1 tablet by mouth Daily. 90 tablet 3   • [DISCONTINUED] escitalopram (LEXAPRO) 20 MG tablet Take 20 mg by mouth Daily.     • [DISCONTINUED] glipiZIDE (GLUCOTROL) 10 MG tablet Take 10 mg by mouth 2 (Two) Times a Day Before Meals.     • [DISCONTINUED] pantoprazole (PROTONIX) 40 MG EC tablet Take 40 mg by mouth Daily.       No facility-administered encounter medications on file as  of 2021.     ADULT ILLNESSES:  Patient Active Problem List   Diagnosis Code   • Ductal carcinoma in situ (DCIS) of right breast D05.11     SURGERIES:  Past Surgical History:   Procedure Laterality Date   • APPENDECTOMY     • BILATERAL BREAST REDUCTION     • BREAST BIOPSY     • BREAST LUMPECTOMY     •  SECTION      X2   • CHOLECYSTECTOMY     • ERCP     • HERNIA REPAIR     • HYSTERECTOMY      PARTIAL   • KNEE ACL RECONSTRUCTION Right    • MASTECTOMY W/ SENTINEL NODE BIOPSY Right 2019    Procedure: RIGHT NEEDLE DIRECTED PARTIAL MASTECTOMY, MAMMO GUIDED AND SENTINEL LYMPH NODE BIOPSY, RADIOLOGIST TO INJECT AND SCAN;  Surgeon: Ana Muñoz MD;  Location: [facility];  Service: General   • OOPHORECTOMY     • REDUCTION MAMMAPLASTY       HEALTH MAINTENANCE ITEMS:  Health Maintenance Due   Topic Date Due   • COLORECTAL CANCER SCREENING  Never done   • ANNUAL PHYSICAL  Never done   • Pneumococcal Vaccine 0-64 (1 of 2 - PPSV23) Never done   • ZOSTER VACCINE (1 of 2) Never done   • HEPATITIS C SCREENING  Never done   • PAP SMEAR  Never done   • LIPID PANEL  Never done       <no information>  Last Completed Colonoscopy     This patient has no relevant Health Maintenance data.          There is no immunization history on file for this patient.  Last Completed Mammogram          MAMMOGRAM (Yearly) Next due on 2021  Mammo Diagnostic Digital Tomosynthesis Bilateral With CAD    2020  Mammo Diagnostic Digital Tomosynthesis Bilateral With CAD    2019  Outside Procedure: HC MAMMOGRAM DIAGNOSTIC UNILAT DIGITAL W CAD    2019  Mammo Diagnostic Right With CAD    2019  Mammo Screening Digital Tomosynthesis Bilateral With CAD                  FAMILY HISTORY:  Family History   Problem Relation Age of Onset   • No Known Problems Mother    • No Known Problems Father    • No Known Problems Sister    • No Known Problems Brother    • No Known Problems Daughter    • No Known Problems  "Son    • No Known Problems Maternal Grandmother    • No Known Problems Paternal Grandmother    • No Known Problems Maternal Aunt    • No Known Problems Paternal Aunt    • No Known Problems Other    • Breast cancer Neg Hx    • BRCA 1/2 Neg Hx    • Colon cancer Neg Hx    • Endometrial cancer Neg Hx    • Ovarian cancer Neg Hx      SOCIAL HISTORY:  Social History     Socioeconomic History   • Marital status:      Spouse name: Not on file   • Number of children: Not on file   • Years of education: Not on file   • Highest education level: Not on file   Tobacco Use   • Smoking status: Current Every Day Smoker     Packs/day: 1.00   • Smokeless tobacco: Never Used   Substance and Sexual Activity   • Alcohol use: No   • Drug use: No   • Sexual activity: Defer       REVIEW OF SYSTEMS:    Review of Systems   Constitutional: Positive for fatigue. Negative for chills and fever.        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    Eyes: Negative for discharge and redness.   Respiratory: Negative for cough and shortness of breath.    Cardiovascular: Negative for chest pain and leg swelling.   Gastrointestinal: Negative for abdominal pain, constipation, diarrhea, nausea and vomiting.   Endocrine: Negative for cold intolerance and heat intolerance.   Genitourinary: Negative for difficulty urinating, dysuria and flank pain.   Musculoskeletal: Negative for gait problem and joint swelling.   Skin: Negative for pallor.   Allergic/Immunologic: Negative for food allergies.   Neurological: Negative for dizziness, speech difficulty and weakness.   Hematological: Negative for adenopathy. Does not bruise/bleed easily.   Psychiatric/Behavioral: Negative for agitation, confusion and hallucinations.       VITAL SIGNS: /66   Pulse 65   Temp 97.4 °F (36.3 °C)   Resp 18   Ht 165.1 cm (65\")   Wt 68.6 kg (151 lb 3.2 oz)   SpO2 98%   Breastfeeding No   BMI " 25.16 kg/m²   Pain Score    09/30/21 1358   PainSc: 0-No pain       PHYSICAL EXAMINATION:     Physical Exam  Vitals reviewed.   Constitutional:       General: She is not in acute distress.  HENT:      Head: Normocephalic and atraumatic.   Eyes:      General: No scleral icterus.  Cardiovascular:      Rate and Rhythm: Normal rate and regular rhythm.   Pulmonary:      Effort: No respiratory distress.      Breath sounds: No wheezing or rales.      Comments: No mass palpable either breast. Examined with Jeannine.   Abdominal:      General: Bowel sounds are normal.      Palpations: Abdomen is soft.      Tenderness: There is no abdominal tenderness.   Musculoskeletal:         General: No swelling.      Cervical back: Neck supple.   Skin:     General: Skin is warm and dry.      Coloration: Skin is not pale.   Neurological:      Mental Status: She is alert and oriented to person, place, and time.   Psychiatric:         Mood and Affect: Mood normal.         Behavior: Behavior normal.         Thought Content: Thought content normal.         Judgment: Judgment normal.         LABS    Lab Results - Last 18 Months   Lab Units 06/28/21  1152 03/30/21  1347 05/22/20  0952   HEMOGLOBIN g/dL 12.0 11.1* 12.1   HEMATOCRIT % 36.5 33.2* 36.1   MCV fL 89.0 87.8 86.6   WBC 10*3/mm3 12.30* 6.88 5.22   RDW % 12.1* 12.2* 12.7   MPV fL 11.1 11.3 11.1   PLATELETS 10*3/mm3 222 230 250   IMM GRAN % % 0.2 0.3 0.2   NEUTROS ABS 10*3/mm3 8.94* 3.56 2.83   LYMPHS ABS 10*3/mm3 2.18 2.66 1.68   MONOS ABS 10*3/mm3 0.70 0.41 0.43   EOS ABS 10*3/mm3 0.35 0.14 0.19   BASOS ABS 10*3/mm3 0.10 0.09 0.08   IMMATURE GRANS (ABS) 10*3/mm3 0.03 0.02 0.01   NRBC /100 WBC 0.0 0.0 0.0       Lab Results - Last 18 Months   Lab Units 06/28/21  1152 03/30/21  1347 05/22/20  0952   GLUCOSE mg/dL 127* 116* 221*   SODIUM mmol/L 141 138 137   POTASSIUM mmol/L 4.0 3.9 3.8   CO2 mmol/L 31.0* 31.0* 30.0*   CHLORIDE mmol/L 102 98 98   ANION GAP mmol/L 8.0 9.0 9.0   CREATININE  mg/dL 0.74 0.81 0.81   BUN mg/dL 12 15 12   BUN / CREAT RATIO  16.2 18.5 14.8   CALCIUM mg/dL 9.7 10.1 10.1   EGFR IF NONAFRICN AM mL/min/1.73 81 74 74   ALK PHOS U/L 42 38* 41   TOTAL PROTEIN g/dL 7.2 7.8 7.2   ALT (SGPT) U/L 16 17 25   AST (SGOT) U/L 22 25 28   BILIRUBIN mg/dL 0.2 0.3 0.3   ALBUMIN g/dL 4.40 4.80  4.2 4.70   GLOBULIN gm/dL 2.8 3.0 2.5       Lab Results - Last 18 Months   Lab Units 03/30/21  1347   M-SPIKE g/dL Not Observed       Lab Results - Last 18 Months   Lab Units 06/28/21  1152 03/30/21  1347   IRON mcg/dL 79 59   TIBC mcg/dL 419 459   IRON SATURATION % 19* 13*   FERRITIN ng/mL 60.15 86.38   FOLATE ng/mL  --  17.30       Ember R Pao reports a pain score of 0.       ASSESSMENT:  1.   Ductal arcinoma of the right breast, upper outer quadrant.  Current Stage: AJCC 0  (Tis, N0, M0, G2-3)   Tumor size 0.4 cm.  Hormone Status: ER 100% and GA 33%.  Baseline Node Status: 0/1 positive sentinel node.  Complications of Tumor: None.  Treatment status:Post adjuvant radiation.  On prophylactic tamoxifen.   Prognosis: Good.   2.   Performance status of 1.   3.   Normocytic anemia from iron deficiency and chronic kidney disease stage III, GFR 58 mL/min on 03/25/2021. On oral iron.  4.   Type II diabetes.  On Glucophage.  5.   Hypercholesterolemia.  On simvastatin.  6.   Hypertension.  On lisinopril.       PLAN:  1.     Re:  Prophylactic tamoxifen tolerance.  Ferritin 86.3, iron saturation 13%, reticulocyte at 1.15, B12 at 526, folate at 17.3, EPO at 6.3 and SPEP showed no M spike on 03/30/2021.  2.     Re:  Heme status.  None.  3.     Re:  Pre office CMP.  None.  4.     Re:  Continue prophylactic tamoxifen.  5.     Re:  Mammogram report 06/28/2021.  Status post right lumpectomy and radiation therapy for DCIS. No malignant features.  6.    Continue ongoing management per primary care physician and other specialists.  7.    Plan of care discussed with patient.  " Understanding expressed.  Patient agreeable to proceed.  8.    She will be seen every 6 months for 5 years.  Annual mammogram.   9.    Schedule mammogram 06/2022.  10.  eRx for tamoxifen 20 mg po daily, # 90, with 3 refills.  Observe for potential cataract, thrombosis, and hot flashes.   11.  eRx ferrous sulfate 325 mg p.o. daily #60 with 2 refills.  Stop and call if intolerant.  12.  Questions were answered to their satisfaction. \"Yes.\"  13.  Colonoscopy 08/2021 was negative per patient. \"They found nothing.\"   14.  CBC with differential, ferritin, and iron panel in 3 months.  15.  Return to office in 6 months with pre-office CBC with differential, ferritin, iron panel and CMP.  16.  CMP, CBC with differential, ferritin, and iron panel today.    I have reviewed the assessment and plan and verified the accuracy of it. No changes to assessment and plan since the information was documented. Vinicius Motley MD 09/30/21        I spent 31 total minutes, face-to-face, caring for Ember today.  Greater than 50% of this time involved counseling and/or coordination of care as documented within this note regarding the patient's illness(es), pros and cons of various treatment options, instructions and/or risk reduction.              cc:  (Ana Muñoz MD)         (Pradeep Jo MD)         MURIEL Vázquez  "

## 2021-09-30 ENCOUNTER — LAB (OUTPATIENT)
Dept: LAB | Facility: HOSPITAL | Age: 56
End: 2021-09-30

## 2021-09-30 ENCOUNTER — OFFICE VISIT (OUTPATIENT)
Dept: ONCOLOGY | Facility: CLINIC | Age: 56
End: 2021-09-30

## 2021-09-30 ENCOUNTER — TELEPHONE (OUTPATIENT)
Dept: ONCOLOGY | Facility: CLINIC | Age: 56
End: 2021-09-30

## 2021-09-30 VITALS
HEIGHT: 65 IN | HEART RATE: 65 BPM | DIASTOLIC BLOOD PRESSURE: 66 MMHG | BODY MASS INDEX: 25.19 KG/M2 | SYSTOLIC BLOOD PRESSURE: 132 MMHG | RESPIRATION RATE: 18 BRPM | TEMPERATURE: 97.4 F | WEIGHT: 151.2 LBS | OXYGEN SATURATION: 98 %

## 2021-09-30 DIAGNOSIS — D05.11 DUCTAL CARCINOMA IN SITU (DCIS) OF RIGHT BREAST: Primary | ICD-10-CM

## 2021-09-30 DIAGNOSIS — D50.8 IRON DEFICIENCY ANEMIA SECONDARY TO INADEQUATE DIETARY IRON INTAKE: ICD-10-CM

## 2021-09-30 DIAGNOSIS — D63.1 ANEMIA DUE TO STAGE 3A CHRONIC KIDNEY DISEASE (HCC): ICD-10-CM

## 2021-09-30 DIAGNOSIS — D05.11 DUCTAL CARCINOMA IN SITU (DCIS) OF RIGHT BREAST: ICD-10-CM

## 2021-09-30 DIAGNOSIS — N18.31 ANEMIA DUE TO STAGE 3A CHRONIC KIDNEY DISEASE (HCC): ICD-10-CM

## 2021-09-30 LAB
ALBUMIN SERPL-MCNC: 4.7 G/DL (ref 3.5–5.2)
ALBUMIN/GLOB SERPL: 1.8 G/DL
ALP SERPL-CCNC: 42 U/L (ref 39–117)
ALT SERPL W P-5'-P-CCNC: 18 U/L (ref 1–33)
ANION GAP SERPL CALCULATED.3IONS-SCNC: 7 MMOL/L (ref 5–15)
AST SERPL-CCNC: 23 U/L (ref 1–32)
BASOPHILS # BLD AUTO: 0.11 10*3/MM3 (ref 0–0.2)
BASOPHILS NFR BLD AUTO: 1.5 % (ref 0–1.5)
BILIRUB SERPL-MCNC: 0.2 MG/DL (ref 0–1.2)
BUN SERPL-MCNC: 9 MG/DL (ref 6–20)
BUN/CREAT SERPL: 12 (ref 7–25)
CALCIUM SPEC-SCNC: 9.9 MG/DL (ref 8.6–10.5)
CHLORIDE SERPL-SCNC: 99 MMOL/L (ref 98–107)
CO2 SERPL-SCNC: 33 MMOL/L (ref 22–29)
CREAT SERPL-MCNC: 0.75 MG/DL (ref 0.57–1)
DEPRECATED RDW RBC AUTO: 37.8 FL (ref 37–54)
EOSINOPHIL # BLD AUTO: 0.43 10*3/MM3 (ref 0–0.4)
EOSINOPHIL NFR BLD AUTO: 6 % (ref 0.3–6.2)
ERYTHROCYTE [DISTWIDTH] IN BLOOD BY AUTOMATED COUNT: 11.8 % (ref 12.3–15.4)
FERRITIN SERPL-MCNC: 52.45 NG/ML (ref 13–150)
GFR SERPL CREATININE-BSD FRML MDRD: 80 ML/MIN/1.73
GLOBULIN UR ELPH-MCNC: 2.6 GM/DL
GLUCOSE SERPL-MCNC: 108 MG/DL (ref 65–99)
HCT VFR BLD AUTO: 35 % (ref 34–46.6)
HGB BLD-MCNC: 11.3 G/DL (ref 12–15.9)
IMM GRANULOCYTES # BLD AUTO: 0 10*3/MM3 (ref 0–0.05)
IMM GRANULOCYTES NFR BLD AUTO: 0 % (ref 0–0.5)
IRON 24H UR-MRATE: 73 MCG/DL (ref 37–145)
IRON SATN MFR SERPL: 16 % (ref 20–50)
LYMPHOCYTES # BLD AUTO: 2.84 10*3/MM3 (ref 0.7–3.1)
LYMPHOCYTES NFR BLD AUTO: 39.7 % (ref 19.6–45.3)
MCH RBC QN AUTO: 28.8 PG (ref 26.6–33)
MCHC RBC AUTO-ENTMCNC: 32.3 G/DL (ref 31.5–35.7)
MCV RBC AUTO: 89.1 FL (ref 79–97)
MONOCYTES # BLD AUTO: 0.54 10*3/MM3 (ref 0.1–0.9)
MONOCYTES NFR BLD AUTO: 7.5 % (ref 5–12)
NEUTROPHILS NFR BLD AUTO: 3.24 10*3/MM3 (ref 1.7–7)
NEUTROPHILS NFR BLD AUTO: 45.3 % (ref 42.7–76)
NRBC BLD AUTO-RTO: 0 /100 WBC (ref 0–0.2)
PLATELET # BLD AUTO: 203 10*3/MM3 (ref 140–450)
PMV BLD AUTO: 11.1 FL (ref 6–12)
POTASSIUM SERPL-SCNC: 3.7 MMOL/L (ref 3.5–5.2)
PROT SERPL-MCNC: 7.3 G/DL (ref 6–8.5)
RBC # BLD AUTO: 3.93 10*6/MM3 (ref 3.77–5.28)
SODIUM SERPL-SCNC: 139 MMOL/L (ref 136–145)
TIBC SERPL-MCNC: 466 MCG/DL (ref 298–536)
TRANSFERRIN SERPL-MCNC: 313 MG/DL (ref 200–360)
WBC # BLD AUTO: 7.16 10*3/MM3 (ref 3.4–10.8)

## 2021-09-30 PROCEDURE — 36415 COLL VENOUS BLD VENIPUNCTURE: CPT

## 2021-09-30 PROCEDURE — 83540 ASSAY OF IRON: CPT

## 2021-09-30 PROCEDURE — 84466 ASSAY OF TRANSFERRIN: CPT

## 2021-09-30 PROCEDURE — 82728 ASSAY OF FERRITIN: CPT

## 2021-09-30 PROCEDURE — 99214 OFFICE O/P EST MOD 30 MIN: CPT | Performed by: INTERNAL MEDICINE

## 2021-09-30 PROCEDURE — 85025 COMPLETE CBC W/AUTO DIFF WBC: CPT

## 2021-09-30 PROCEDURE — 80053 COMPREHEN METABOLIC PANEL: CPT

## 2021-09-30 RX ORDER — TAMOXIFEN CITRATE 20 MG/1
20 TABLET ORAL DAILY
Qty: 90 TABLET | Refills: 3 | Status: SHIPPED | OUTPATIENT
Start: 2021-09-30

## 2021-09-30 NOTE — TELEPHONE ENCOUNTER
Called and spoke with patient regarding her low Iron sat of 16%, Dr Motley wants her re-start her oral iron tablets, Ferrous Sulfate,  which she has on hand.she v/u. She is also aware of possible side effects that oral iron can cause. She was encouraged to d/c the tablets if she develops any problems and call the office and let us know. She v/u. She was also informed to call when new prescription is needed.

## 2021-09-30 NOTE — TELEPHONE ENCOUNTER
----- Message from Vinicius Motley MD sent at 9/30/2021  3:39 PM CDT -----   eRx ferrous sulfate 325 mg p.o. daily #60 with 2 refills.  Stop and call if intolerant

## 2022-02-07 ENCOUNTER — TRANSCRIBE ORDERS (OUTPATIENT)
Dept: ADMINISTRATIVE | Facility: HOSPITAL | Age: 57
End: 2022-02-07

## 2022-02-07 DIAGNOSIS — Z12.2 ENCOUNTER FOR SCREENING FOR LUNG CANCER: Primary | ICD-10-CM

## 2022-02-07 DIAGNOSIS — F17.200 SMOKER: ICD-10-CM

## 2022-02-16 ENCOUNTER — HOSPITAL ENCOUNTER (OUTPATIENT)
Dept: CT IMAGING | Facility: HOSPITAL | Age: 57
Discharge: HOME | End: 2022-02-16

## 2022-03-24 ENCOUNTER — APPOINTMENT (OUTPATIENT)
Dept: LAB | Facility: HOSPITAL | Age: 57
End: 2022-03-24

## 2022-06-03 DIAGNOSIS — D50.8 IRON DEFICIENCY ANEMIA SECONDARY TO INADEQUATE DIETARY IRON INTAKE: Primary | ICD-10-CM

## 2022-10-20 ENCOUNTER — TRANSCRIBE ORDERS (OUTPATIENT)
Dept: ADMINISTRATIVE | Facility: HOSPITAL | Age: 57
End: 2022-10-20

## 2022-10-20 DIAGNOSIS — Z12.31 ENCOUNTER FOR SCREENING MAMMOGRAM FOR MALIGNANT NEOPLASM OF BREAST: Primary | ICD-10-CM

## 2022-10-27 ENCOUNTER — APPOINTMENT (OUTPATIENT)
Dept: MAMMOGRAPHY | Facility: HOSPITAL | Age: 57
End: 2022-10-27

## 2022-11-17 ENCOUNTER — HOSPITAL ENCOUNTER (OUTPATIENT)
Dept: MAMMOGRAPHY | Facility: HOSPITAL | Age: 57
Discharge: HOME | End: 2022-11-17
Admitting: NURSE PRACTITIONER

## 2022-11-17 DIAGNOSIS — Z12.31 ENCOUNTER FOR SCREENING MAMMOGRAM FOR MALIGNANT NEOPLASM OF BREAST: ICD-10-CM

## 2022-11-17 DIAGNOSIS — Z85.3 HISTORY OF BREAST CANCER: ICD-10-CM

## 2022-11-17 PROCEDURE — G0279 TOMOSYNTHESIS, MAMMO: HCPCS

## 2022-11-17 PROCEDURE — 77066 DX MAMMO INCL CAD BI: CPT

## 2023-02-21 ENCOUNTER — TELEPHONE (OUTPATIENT)
Dept: NEUROSURGERY | Age: 58
End: 2023-02-21

## 2023-02-21 NOTE — TELEPHONE ENCOUNTER
[facility] Neurosurgery New Patient Questionnaire    Diagnosis/Reason for Referral?    worsening chronic neck pain    2. Who is completing questionnaire? Patient  Caregiver Family      3. Has the patient had any previous spinal/brain surgeries? YES NECK       A. If yes, what is the name of the facility in which the surgery was performed? [place]     B. Procedure/Surgery performed? \"DOES NOT KNOW\"     C. Who was the surgeon? \"DOES NOT KNOW\"     D. When was the surgery? 2008       E. Did the patient improve after the surgery? \"A LITTLE FOR A LITTLE WHILE\"      4. Is this a second opinion? If yes, Dr. Ester Bowers would like to review patient first before making the appointment. NO    5. Have MRI Images been obtain within the last year? Yes  No      XR  CT     If yes, where was the imaging performed? [facility]    If yes, what part of the body? Lumbar  Cervical  Thoracic  Brain     If yes, when was it obtained? AWEEK AGO    Note: if the scan was performed at a facility other than [facility], the disc will need to be brought to the appointment or we need to reach out to obtain the disc. A. Was the patient instructed to provide the disc? Yes   No      8. Has the patient had a NCV/EMG within the last year? Yes  No     If yes, where was it performed and date? MM/YY  Location:      9. Has the patient been to Physical Therapy? Yes  No     If yes, what location, how long attended, and last visit? Location:        Therapy Lasted:    Date of Last Visit:      10. Has the patient been to Pain Management? Yes  No     If yes, what location and last visit     Location:   Last Visit:   Is it helping?

## 2023-03-08 ENCOUNTER — OFFICE VISIT (OUTPATIENT)
Dept: NEUROSURGERY | Age: 58
End: 2023-03-08
Payer: COMMERCIAL

## 2023-03-08 VITALS
OXYGEN SATURATION: 97 % | DIASTOLIC BLOOD PRESSURE: 64 MMHG | RESPIRATION RATE: 18 BRPM | HEIGHT: 65 IN | BODY MASS INDEX: 21.33 KG/M2 | WEIGHT: 128 LBS | HEART RATE: 86 BPM | SYSTOLIC BLOOD PRESSURE: 128 MMHG

## 2023-03-08 DIAGNOSIS — G89.29 CHRONIC LEFT SHOULDER PAIN: ICD-10-CM

## 2023-03-08 DIAGNOSIS — M50.30 DDD (DEGENERATIVE DISC DISEASE), CERVICAL: Primary | ICD-10-CM

## 2023-03-08 DIAGNOSIS — R20.0 NUMBNESS AND TINGLING IN LEFT HAND: ICD-10-CM

## 2023-03-08 DIAGNOSIS — Z98.1 HISTORY OF FUSION OF CERVICAL SPINE: ICD-10-CM

## 2023-03-08 DIAGNOSIS — M25.512 CHRONIC LEFT SHOULDER PAIN: ICD-10-CM

## 2023-03-08 DIAGNOSIS — M79.602 LEFT ARM PAIN: ICD-10-CM

## 2023-03-08 DIAGNOSIS — M54.2 NECK PAIN: ICD-10-CM

## 2023-03-08 DIAGNOSIS — R20.2 NUMBNESS AND TINGLING IN LEFT HAND: ICD-10-CM

## 2023-03-08 PROCEDURE — 99204 OFFICE O/P NEW MOD 45 MIN: CPT | Performed by: NURSE PRACTITIONER

## 2023-03-08 RX ORDER — FLUOXETINE HYDROCHLORIDE 20 MG/1
20 CAPSULE ORAL 3 TIMES DAILY
COMMUNITY
Start: 2023-03-07

## 2023-03-08 RX ORDER — SEMAGLUTIDE 1.34 MG/ML
INJECTION, SOLUTION SUBCUTANEOUS
COMMUNITY
Start: 2023-03-02

## 2023-03-08 RX ORDER — POTASSIUM CHLORIDE 20 MEQ/1
TABLET, EXTENDED RELEASE ORAL
COMMUNITY
Start: 2023-03-02

## 2023-03-08 RX ORDER — ROSUVASTATIN CALCIUM 20 MG/1
20 TABLET, COATED ORAL DAILY
COMMUNITY
Start: 2023-03-02

## 2023-03-08 RX ORDER — METHOCARBAMOL 750 MG/1
750 TABLET, FILM COATED ORAL 3 TIMES DAILY PRN
Qty: 90 TABLET | Refills: 1 | Status: SHIPPED | OUTPATIENT
Start: 2023-03-08 | End: 2023-04-07

## 2023-03-08 RX ORDER — CYCLOBENZAPRINE HCL 5 MG
5 TABLET ORAL DAILY PRN
COMMUNITY
Start: 2023-01-25

## 2023-03-08 RX ORDER — TAMOXIFEN CITRATE 20 MG/1
20 TABLET ORAL DAILY
COMMUNITY
Start: 2023-03-02

## 2023-03-08 ASSESSMENT — ENCOUNTER SYMPTOMS
EYES NEGATIVE: 1
RESPIRATORY NEGATIVE: 1
GASTROINTESTINAL NEGATIVE: 1

## 2023-03-08 NOTE — PROGRESS NOTES
[facility]  Office Visit      Chief Complaint   Patient presents with    New Patient     Establishing care     Results     Imaging in PACS    Neck Pain     Patient states she has had pain for awhile and noticed it worsening in the past several months. She does have radiating pain into her LUE that comes and goes. She is taking Aleve PRN to help manage the pain. Numbness     Patient states she does have numbness/tingling in her LUE and she also drops things a lot. Headache     Patient states she does have frequent headaches. HISTORY OF PRESENT ILLNESS:    Gregorio Maldonado is a 65 y.o. female with a history of an ACDF C6-7 per a surgeon she cannot recall in [place], [place] in  who presents with neck pain and left shoulder pain. Prior to surgery she complained mostly of neck pain and headaches. Following surgery she improved somewhat. Currently her main concern is neck pain and stiffness. The pain does radiate into the LEFT shoulder, triceps, and stops at the elbow. The patient complains of numbness and paresthesias of the entire LEFT hand. She does have numbness in the fingertips, trouble using hands to perform fine motor tasks. No trouble walking. The patient has underwent a non-operative treatment course that has included:  NSAIDs (Aleve)  Muscle Relaxers (flexeril - does not take much)  Gabapentin  Opiates (Suboxone)  Oral Steroids (dose pack)      Of note she does not use tobacco and does not take blood thinning medications.                Past Medical History:   Diagnosis Date    Neuropathy        Past Surgical History:   Procedure Laterality Date    ANTERIOR CRUCIATE LIGAMENT REPAIR      APPENDECTOMY       SECTION      x 2    CHOLECYSTECTOMY      HERNIA REPAIR      HYSTERECTOMY (CERVIX STATUS UNKNOWN)      MASTECTOMY, PARTIAL         Current Outpatient Medications   Medication Sig Dispense Refill    OZEMPIC, 0.25 OR 0.5 MG/DOSE, 2 MG/1.5ML SOPN cyclobenzaprine (FLEXERIL) 5 MG tablet Take 5 mg by mouth daily as needed      FLUoxetine (PROZAC) 20 MG capsule Take 20 mg by mouth in the morning, at noon, and at bedtime      potassium chloride (KLOR-CON M) 20 MEQ extended release tablet       rosuvastatin (CRESTOR) 20 MG tablet Take 20 mg by mouth daily      tamoxifen (NOLVADEX) 20 MG tablet Take 20 mg by mouth daily      methocarbamol (ROBAXIN-750) 750 MG tablet Take 1 tablet by mouth 3 times daily as needed (spasms) 90 tablet 1    gabapentin (NEURONTIN) 800 MG tablet TAKE 1 TABLET BY MOUTH FOUR TIMES DAILY  5    pantoprazole (PROTONIX) 40 MG tablet TAKE 1 TABLET(S) EVERY DAY BY ORAL ROUTE.  5    simvastatin (ZOCOR) 40 MG tablet TAKE 1 TABLET(S) EVERY DAY BY ORAL ROUTE.  5    levothyroxine (SYNTHROID) 50 MCG tablet       hydrochlorothiazide (HYDRODIURIL) 25 MG tablet       lisinopril (PRINIVIL;ZESTRIL) 20 MG tablet TAKE 1 TABLET BY MOUTH EVERY DAY  5    buprenorphine-naloxone (SUBOXONE) 4-1 MG FILM SL film 3 TABLETS UNDER TOUNGE EVERY DAY  0     No current facility-administered medications for this visit. Allergies:  Patient has no known allergies. Social History:   Social History     Tobacco Use   Smoking Status Every Day   Smokeless Tobacco Never     Social History     Substance and Sexual Activity   Alcohol Use Never         Family History:   Family History   Problem Relation Age of Onset    Heart Disease Mother     Hypertension Father     Heart Disease Father     Diabetes Father        REVIEW OF SYSTEMS:  Constitutional: Negative. HENT: Negative. Eyes: Negative. Respiratory: Negative. Cardiovascular: Negative. Gastrointestinal: Negative. Genitourinary: Negative. Musculoskeletal:  Positive for joint pain, myalgias and neck pain. Skin: Negative. Neurological:  Positive for tingling, weakness and headaches. Endo/Heme/Allergies: Negative. Psychiatric/Behavioral: Negative.        PHYSICAL EXAM:  Vitals:    03/08/23 1522 BP: 128/64   Pulse: 86   Resp: 18   SpO2: 97%     Constitutional: appears well-developed and well-nourished. Eyes - conjunctiva normal.  Pupils react to light  Ear, nose, throat - hearing intact to finger rub, No scars, masses, or lesions over external nose or ears, no atrophy oftongue  Neck- symmetric, no masses noted, no jugular vein distension  Respiration- chest wall appears symmetric, good expansion, normal effort without use of accessory muscles  Musculoskeletal - no significant wasting of muscles noted, no bony deformities, gait no gross ataxia  Extremities- no clubbing, cyanosis or edema  Skin - warm, dry, and intact. No rash, erythema, or pallor. Psychiatric - mood, affect, and behavior appear normal.     Neurologic Examination  Awake, Alert and oriented x 4  Normal speech pattern, following commands    Motor:  RIGHT: hand grasp 5/5    finger extension 5/5    bicep 5/5    triceps 5/5    deltoid 5/5    LEFT:   hand grasp 5/5    finger extension 5/5    bicep 5/5    triceps 5/5    deltoid 4-/5 (give way weakness)    Decrease to pinprick sensation bilateral hands L>R  Reflexes are 1+ and symmetric BUE  No clonus or Hoffmans sign  No myofacial tenderness to palpation  Normal Gait pattern    Mild to moderate pain to left shoulder with external rotation and abduction     DATA and IMAGING:    Nursing/pcp notes, imaging, labs, and vitals reviewed.      PT,OT and/or speech notes reviewed    No results found for: WBC, HGB, HCT, MCV, PLTNo results found for: NA, K, CL, CO2, BUN, CREATININE, GLUCOSE, CALCIUM, PROT, LABALBU, BILITOT, ALKPHOS, AST, ALT, LABGLOM, GFRAA, AGRATIO, GLOBNo results found for: INR, PROTIME      XR Cervical Spine (2/13/2023)   I have personally reviewed these images and my interpretation is:  ACDF C6-7 that appears to be fully fused, hardware in tact, no screw pullout  Straightening of the cervical spine       ASSESSMENT:    Cherry Vasquez is a 65 y.o. female with history of ACDF C6-7 per a surgeon in [place] in [address] with current complaints of neck pain, left shoulder and left arm pain. ICD-10-CM    1. DDD (degenerative disc disease), cervical  M50.30 External Referral To Physical Therapy      2. Neck pain  M54.2 External Referral To Physical Therapy      3. Chronic left shoulder pain  M25.512 External Referral To Physical Therapy    G89.29       4. Left arm pain  M79.602 External Referral To Physical Therapy      5. Numbness and tingling in left hand  R20.0 External Referral To Physical Therapy    R20.2       6. History of fusion of cervical spine  Z98.1 External Referral To Physical Therapy          PLAN:  I have discussed and reviewed the results of the XR cervical spine with Mrs. Ballesteros and her spouse at length. I explained that the hardware from her previous surgery appear intact, no screw pullout, it looks fused. She millard shave some degenerative changes. Neural element compression cannot be evaluated on plain films.   Will have her attempt PT given that she has not done so in the last 12 months.      -Robaxin   -TENS unit   -Start PT ([facility])  -Follow up after PT        KARTHIK aMyers

## 2023-04-24 ENCOUNTER — TELEPHONE (OUTPATIENT)
Dept: NEUROLOGY | Age: 58
End: 2023-04-24

## 2023-04-24 NOTE — TELEPHONE ENCOUNTER
Called patient and rescheduled her for 5/22/2023 @ 3:15pm. Patient thanked me and voiced understanding.

## 2023-04-24 NOTE — TELEPHONE ENCOUNTER
Patient has appointment on 04/24/2023 and has only had 1 week of physical therapy and is needing to be rescheduled. Patient requesting a call back. Patient had scheduled appointment on 04/19/2023.

## 2023-05-22 ENCOUNTER — TELEPHONE (OUTPATIENT)
Dept: NEUROSURGERY | Age: 58
End: 2023-05-22

## 2023-05-22 ENCOUNTER — OFFICE VISIT (OUTPATIENT)
Dept: NEUROSURGERY | Age: 58
End: 2023-05-22
Payer: COMMERCIAL

## 2023-05-22 VITALS
SYSTOLIC BLOOD PRESSURE: 110 MMHG | DIASTOLIC BLOOD PRESSURE: 66 MMHG | WEIGHT: 128 LBS | BODY MASS INDEX: 21.33 KG/M2 | OXYGEN SATURATION: 100 % | HEART RATE: 77 BPM | HEIGHT: 65 IN

## 2023-05-22 DIAGNOSIS — G89.29 CHRONIC LEFT SHOULDER PAIN: ICD-10-CM

## 2023-05-22 DIAGNOSIS — M50.30 DDD (DEGENERATIVE DISC DISEASE), CERVICAL: Primary | ICD-10-CM

## 2023-05-22 DIAGNOSIS — M79.602 LEFT ARM PAIN: ICD-10-CM

## 2023-05-22 DIAGNOSIS — Z98.1 HISTORY OF FUSION OF CERVICAL SPINE: ICD-10-CM

## 2023-05-22 DIAGNOSIS — R20.0 NUMBNESS AND TINGLING IN LEFT HAND: ICD-10-CM

## 2023-05-22 DIAGNOSIS — R20.2 NUMBNESS AND TINGLING IN LEFT HAND: ICD-10-CM

## 2023-05-22 DIAGNOSIS — M54.2 NECK PAIN: ICD-10-CM

## 2023-05-22 DIAGNOSIS — M25.512 CHRONIC LEFT SHOULDER PAIN: ICD-10-CM

## 2023-05-22 PROBLEM — E78.2 MIXED HYPERLIPIDEMIA: Status: ACTIVE | Noted: 2019-06-21

## 2023-05-22 PROBLEM — N60.11 FIBROCYSTIC BREAST CHANGES OF BOTH BREASTS: Status: ACTIVE | Noted: 2020-06-15

## 2023-05-22 PROBLEM — N60.12 FIBROCYSTIC BREAST CHANGES OF BOTH BREASTS: Status: ACTIVE | Noted: 2020-06-15

## 2023-05-22 PROBLEM — D05.11 DUCTAL CARCINOMA IN SITU (DCIS) OF RIGHT BREAST: Status: ACTIVE | Noted: 2019-09-17

## 2023-05-22 PROBLEM — E55.9 VITAMIN D DEFICIENCY: Status: ACTIVE | Noted: 2019-06-21

## 2023-05-22 PROBLEM — R07.9 CHEST PAIN: Status: ACTIVE | Noted: 2020-06-15

## 2023-05-22 PROCEDURE — 99213 OFFICE O/P EST LOW 20 MIN: CPT | Performed by: NURSE PRACTITIONER

## 2023-05-22 RX ORDER — METHOCARBAMOL 750 MG/1
750 TABLET, FILM COATED ORAL 3 TIMES DAILY PRN
Qty: 90 TABLET | Refills: 2 | Status: SHIPPED | OUTPATIENT
Start: 2023-05-22 | End: 2023-06-21

## 2023-05-22 ASSESSMENT — ENCOUNTER SYMPTOMS
GASTROINTESTINAL NEGATIVE: 1
RESPIRATORY NEGATIVE: 1
EYES NEGATIVE: 1

## 2023-05-22 NOTE — PROGRESS NOTES
[facility] Neurosurgery  Office Visit      Chief Complaint   Patient presents with    Follow-up     Eval after PT       HISTORY OF PRESENT ILLNESS:    Myke Vila is a 65 y.o. female with a history of an ACDF C6-7 per a surgeon she cannot recall in [place], [place] in  who presents with neck pain and left shoulder pain. Prior to surgery she complained mostly of neck pain and headaches. Following surgery she improved somewhat. Currently her main concern is neck pain and stiffness. The pain does radiate into the LEFT shoulder, triceps, and stops at the elbow. The patient complains of numbness and paresthesias of the entire LEFT hand. She does have numbness in the fingertips, trouble using hands to perform fine motor tasks. No trouble walking. Today she returns to clinic to evaluate her progress after participating in PT. She states PT did not give her much relief. Continues to have pain in the left shoulder, triceps, and stops at the elbow. She does state that she has been dizzy recently, feeling off balance, veering to the left. She is starting to have more headaches. The patient has underwent a non-operative treatment course that has included:  NSAIDs (Aleve)  Muscle Relaxers (flexeril - does not take much)  Gabapentin  Opiates (Suboxone)  Oral Steroids (dose pack)      Of note she does not use tobacco and does not take blood thinning medications.                Past Medical History:   Diagnosis Date    Neuropathy        Past Surgical History:   Procedure Laterality Date    ANTERIOR CRUCIATE LIGAMENT REPAIR      APPENDECTOMY       SECTION      x 2    CHOLECYSTECTOMY      HERNIA REPAIR      HYSTERECTOMY (CERVIX STATUS UNKNOWN)      MASTECTOMY, PARTIAL         Current Outpatient Medications   Medication Sig Dispense Refill    methocarbamol (ROBAXIN-750) 750 MG tablet Take 1 tablet by mouth 3 times daily as needed (spasms) 90 tablet 2    OZEMPIC, 0.25 OR 0.5 MG/DOSE, 2

## 2023-05-22 NOTE — PROGRESS NOTES
Review of Systems   Constitutional: Negative. HENT: Negative. Eyes: Negative. Respiratory: Negative. Cardiovascular: Negative. Gastrointestinal: Negative. Genitourinary: Negative. Musculoskeletal: Negative. Skin: Negative. Neurological:  Positive for dizziness. Endo/Heme/Allergies: Negative. Psychiatric/Behavioral: Negative.

## 2023-05-25 ENCOUNTER — CLINICAL DOCUMENTATION (OUTPATIENT)
Dept: NEUROSURGERY | Age: 58
End: 2023-05-25

## 2023-05-25 NOTE — PROGRESS NOTES
Procedures: MRI CERVICAL    Primary Coverage: BCBS  Secondary Coverage Requires Auth:    Source:KEIRA  Case/Tracking/Auth Ref # [id number]    Case Status: APPROVED    Valid Dates if approved: 5/25/23-6/23/23    Additional Comments: FAXED TO Rosales Pizarro

## 2023-07-06 ENCOUNTER — TELEPHONE (OUTPATIENT)
Dept: NEUROSURGERY | Age: 58
End: 2023-07-06

## 2023-07-06 NOTE — TELEPHONE ENCOUNTER
Called patient in regards to rescheduling missed appointment from a few weeks ago. Rescheduled patient for 7/11/2023 @ 10:30am. Patient thanked me and voiced understanding.

## 2023-07-11 ENCOUNTER — OFFICE VISIT (OUTPATIENT)
Dept: NEUROSURGERY | Age: 58
End: 2023-07-11
Payer: COMMERCIAL

## 2023-07-11 VITALS
BODY MASS INDEX: 21.33 KG/M2 | RESPIRATION RATE: 18 BRPM | DIASTOLIC BLOOD PRESSURE: 68 MMHG | OXYGEN SATURATION: 98 % | HEART RATE: 69 BPM | WEIGHT: 128 LBS | HEIGHT: 65 IN | SYSTOLIC BLOOD PRESSURE: 112 MMHG

## 2023-07-11 DIAGNOSIS — G89.29 CHRONIC LEFT SHOULDER PAIN: ICD-10-CM

## 2023-07-11 DIAGNOSIS — M25.512 CHRONIC LEFT SHOULDER PAIN: ICD-10-CM

## 2023-07-11 DIAGNOSIS — M48.02 FORAMINAL STENOSIS OF CERVICAL REGION: Primary | ICD-10-CM

## 2023-07-11 DIAGNOSIS — R20.2 NUMBNESS AND TINGLING IN LEFT HAND: ICD-10-CM

## 2023-07-11 DIAGNOSIS — R20.0 NUMBNESS AND TINGLING IN LEFT HAND: ICD-10-CM

## 2023-07-11 DIAGNOSIS — M54.2 NECK PAIN: ICD-10-CM

## 2023-07-11 DIAGNOSIS — M50.30 DDD (DEGENERATIVE DISC DISEASE), CERVICAL: ICD-10-CM

## 2023-07-11 DIAGNOSIS — Z98.1 HISTORY OF FUSION OF CERVICAL SPINE: ICD-10-CM

## 2023-07-11 PROCEDURE — 99214 OFFICE O/P EST MOD 30 MIN: CPT | Performed by: NEUROLOGICAL SURGERY

## 2023-07-11 ASSESSMENT — ENCOUNTER SYMPTOMS
GASTROINTESTINAL NEGATIVE: 1
RESPIRATORY NEGATIVE: 1
EYES NEGATIVE: 1

## 2023-07-11 NOTE — PROGRESS NOTES
[facility] Neurosurgery  Office Visit      Chief Complaint   Patient presents with    Results     Imaging in PACS    Neck Pain     Patient states her symptoms are about the same as they were at her last visit. Numbness     Patient states she does have numbness/tingling in her left hand. HISTORY OF PRESENT ILLNESS:    Laron Nageotte is a 63 y.o. female 5th  with a history of breast cancer (2019) s/p radiation and chemo and previous an ACDF C6-7 per a surgeon she cannot recall in [place], [place] in  who presents with neck pain and left shoulder pain. Prior to surgery she complained mostly of neck pain and headaches. Following surgery she improved somewhat. Currently her main concern is neck pain and stiffness. The pain does radiate into the LEFT shoulder, triceps, and stops at the elbow. The patient complains of numbness and paresthesias of the entire LEFT hand. She does have numbness in the fingertips, trouble using hands to perform fine motor tasks. No trouble walking. We sent her to PT and she failed to improved. Today she returns to the clinic for follow up. She states she will have numbness of the LEFT hand. The pain will just radiate into the LEFT shoulder. Denies any fine motor impairment. 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 The patient has underwent a non-operative treatment course that has included:  NSAIDs (Aleve)  Muscle Relaxers (flexeril - does not take much)  Gabapentin  Opiates (Suboxone)  Oral Steroids (dose pack)  Physical Therapy (no much help)      Of note she does use tobacco and does not take blood thinning medications.                Past Medical History:   Diagnosis Date    Neuropathy        Past Surgical History:   Procedure Laterality Date    ANTERIOR CRUCIATE LIGAMENT REPAIR      APPENDECTOMY       SECTION      x 2    CHOLECYSTECTOMY      HERNIA REPAIR      HYSTERECTOMY (CERVIX STATUS UNKNOWN)

## 2024-04-30 ENCOUNTER — TRANSCRIBE ORDERS (OUTPATIENT)
Dept: ADMINISTRATIVE | Facility: HOSPITAL | Age: 59
End: 2024-04-30
Payer: COMMERCIAL

## 2024-04-30 DIAGNOSIS — R59.9 ENLARGED LYMPH NODES, UNSPECIFIED: Primary | ICD-10-CM

## 2024-05-10 ENCOUNTER — APPOINTMENT (OUTPATIENT)
Dept: OTHER | Facility: HOSPITAL | Age: 59
End: 2024-05-10
Payer: COMMERCIAL

## 2024-05-10 ENCOUNTER — HOSPITAL ENCOUNTER (OUTPATIENT)
Dept: CT IMAGING | Facility: HOSPITAL | Age: 59
Discharge: HOME | End: 2024-05-10
Payer: COMMERCIAL

## 2024-05-10 DIAGNOSIS — R59.9 ENLARGED LYMPH NODES, UNSPECIFIED: ICD-10-CM

## 2024-05-10 PROCEDURE — 78815 PET IMAGE W/CT SKULL-THIGH: CPT

## 2024-05-10 PROCEDURE — A9552 F18 FDG: HCPCS | Performed by: NURSE PRACTITIONER

## 2024-05-10 PROCEDURE — 0 FLUDEOXYGLUCOSE F18 SOLUTION: Performed by: NURSE PRACTITIONER

## 2024-05-10 RX ADMIN — FLUDEOXYGLUCOSE F 18 1 DOSE: 200 INJECTION, SOLUTION INTRAVENOUS at 12:07

## 2024-05-15 RX ORDER — DOCUSATE SODIUM 100 MG/1
100 CAPSULE, LIQUID FILLED ORAL EVERY 8 HOURS SCHEDULED
COMMUNITY

## 2024-05-15 RX ORDER — CYCLOBENZAPRINE HCL 10 MG
10 TABLET ORAL 3 TIMES DAILY PRN
COMMUNITY
Start: 2024-05-13

## 2024-05-15 RX ORDER — ONDANSETRON 4 MG/1
4 TABLET, ORALLY DISINTEGRATING ORAL EVERY 8 HOURS PRN
COMMUNITY

## 2024-05-15 RX ORDER — ALBUTEROL SULFATE 90 UG/1
AEROSOL, METERED RESPIRATORY (INHALATION)
COMMUNITY

## 2024-05-15 RX ORDER — ESCITALOPRAM OXALATE 20 MG/1
20 TABLET ORAL DAILY
COMMUNITY

## 2024-05-15 RX ORDER — OMEPRAZOLE 20 MG/1
20 CAPSULE, DELAYED RELEASE ORAL DAILY
COMMUNITY

## 2024-05-15 RX ORDER — LISINOPRIL AND HYDROCHLOROTHIAZIDE 25; 20 MG/1; MG/1
1 TABLET ORAL DAILY
COMMUNITY

## 2024-05-15 RX ORDER — SEMAGLUTIDE 1.34 MG/ML
0.5 INJECTION, SOLUTION SUBCUTANEOUS WEEKLY
COMMUNITY
Start: 2024-04-05

## 2024-05-15 RX ORDER — SUCRALFATE ORAL 1 G/10ML
10 SUSPENSION ORAL EVERY 6 HOURS SCHEDULED
COMMUNITY

## 2024-05-15 RX ORDER — GLIPIZIDE 10 MG/1
10 TABLET ORAL
COMMUNITY

## 2024-05-15 RX ORDER — ERGOCALCIFEROL 1.25 MG/1
50000 CAPSULE ORAL WEEKLY
COMMUNITY

## 2024-05-15 RX ORDER — ROSUVASTATIN CALCIUM 20 MG/1
1 TABLET, COATED ORAL DAILY
COMMUNITY

## 2024-05-15 RX ORDER — METOCLOPRAMIDE 10 MG/1
10 TABLET ORAL
COMMUNITY

## 2024-05-15 RX ORDER — PANTOPRAZOLE SODIUM 40 MG/1
40 TABLET, DELAYED RELEASE ORAL DAILY
COMMUNITY

## 2024-05-15 RX ORDER — HYDROXYZINE 50 MG/1
50 TABLET, FILM COATED ORAL
COMMUNITY

## 2024-05-15 RX ORDER — LAMOTRIGINE 100 MG/1
1 TABLET ORAL DAILY
COMMUNITY

## 2024-05-15 RX ORDER — VILAZODONE HYDROCHLORIDE 20 MG/1
20 TABLET ORAL DAILY
COMMUNITY

## 2024-05-15 RX ORDER — PROPRANOLOL HYDROCHLORIDE 10 MG/1
10 TABLET ORAL 2 TIMES DAILY
COMMUNITY

## 2024-05-15 RX ORDER — METHOCARBAMOL 750 MG/1
750 TABLET, FILM COATED ORAL
COMMUNITY

## 2024-05-15 RX ORDER — POTASSIUM CHLORIDE 20 MEQ/1
1 TABLET, EXTENDED RELEASE ORAL DAILY
COMMUNITY

## 2024-05-15 RX ORDER — BUPROPION HYDROCHLORIDE 150 MG/1
150 TABLET ORAL EVERY MORNING
COMMUNITY

## 2024-05-23 ENCOUNTER — TELEPHONE (OUTPATIENT)
Dept: PULMONOLOGY | Facility: CLINIC | Age: 59
End: 2024-05-23
Payer: COMMERCIAL

## 2024-05-23 ENCOUNTER — PRE-ADMISSION TESTING (OUTPATIENT)
Dept: PREADMISSION TESTING | Facility: HOSPITAL | Age: 59
End: 2024-05-23
Payer: COMMERCIAL

## 2024-05-23 ENCOUNTER — HOSPITAL ENCOUNTER (OUTPATIENT)
Dept: CT IMAGING | Facility: HOSPITAL | Age: 59
Discharge: HOME | End: 2024-05-23
Payer: COMMERCIAL

## 2024-05-23 ENCOUNTER — PREP FOR SURGERY (OUTPATIENT)
Dept: OTHER | Facility: HOSPITAL | Age: 59
End: 2024-05-23
Payer: COMMERCIAL

## 2024-05-23 ENCOUNTER — OFFICE VISIT (OUTPATIENT)
Dept: PULMONOLOGY | Facility: CLINIC | Age: 59
End: 2024-05-23
Payer: COMMERCIAL

## 2024-05-23 VITALS
OXYGEN SATURATION: 98 % | HEART RATE: 74 BPM | WEIGHT: 144.84 LBS | DIASTOLIC BLOOD PRESSURE: 78 MMHG | BODY MASS INDEX: 24.13 KG/M2 | HEIGHT: 65 IN | SYSTOLIC BLOOD PRESSURE: 137 MMHG | RESPIRATION RATE: 18 BRPM

## 2024-05-23 VITALS
HEART RATE: 96 BPM | SYSTOLIC BLOOD PRESSURE: 122 MMHG | OXYGEN SATURATION: 97 % | WEIGHT: 143.2 LBS | BODY MASS INDEX: 23.86 KG/M2 | DIASTOLIC BLOOD PRESSURE: 72 MMHG | HEIGHT: 65 IN

## 2024-05-23 DIAGNOSIS — R91.8 MASS OF MIDDLE LOBE OF RIGHT LUNG: Primary | ICD-10-CM

## 2024-05-23 DIAGNOSIS — J98.4 PULMONARY SCARRING: ICD-10-CM

## 2024-05-23 DIAGNOSIS — J30.89 NON-SEASONAL ALLERGIC RHINITIS, UNSPECIFIED TRIGGER: ICD-10-CM

## 2024-05-23 DIAGNOSIS — J44.9 CHRONIC OBSTRUCTIVE PULMONARY DISEASE, UNSPECIFIED COPD TYPE: ICD-10-CM

## 2024-05-23 DIAGNOSIS — G89.29 OTHER CHRONIC PAIN: ICD-10-CM

## 2024-05-23 DIAGNOSIS — Z86.16 HISTORY OF COVID-19: ICD-10-CM

## 2024-05-23 DIAGNOSIS — D05.11 DUCTAL CARCINOMA IN SITU (DCIS) OF RIGHT BREAST: ICD-10-CM

## 2024-05-23 DIAGNOSIS — R91.8 MASS OF MIDDLE LOBE OF RIGHT LUNG: ICD-10-CM

## 2024-05-23 DIAGNOSIS — Z87.891 FORMER SMOKER: ICD-10-CM

## 2024-05-23 DIAGNOSIS — G47.33 OSA (OBSTRUCTIVE SLEEP APNEA): ICD-10-CM

## 2024-05-23 DIAGNOSIS — R53.83 OTHER FATIGUE: ICD-10-CM

## 2024-05-23 PROBLEM — R91.1 LUNG NODULE: Status: ACTIVE | Noted: 2024-05-23

## 2024-05-23 LAB
ANION GAP SERPL CALCULATED.3IONS-SCNC: 7 MMOL/L (ref 5–15)
BUN SERPL-MCNC: 15 MG/DL (ref 6–20)
BUN/CREAT SERPL: 18.1 (ref 7–25)
CALCIUM SPEC-SCNC: 9.7 MG/DL (ref 8.6–10.5)
CHLORIDE SERPL-SCNC: 98 MMOL/L (ref 98–107)
CO2 SERPL-SCNC: 34 MMOL/L (ref 22–29)
CREAT SERPL-MCNC: 0.83 MG/DL (ref 0.57–1)
DEPRECATED RDW RBC AUTO: 43.7 FL (ref 37–54)
EGFRCR SERPLBLD CKD-EPI 2021: 82.3 ML/MIN/1.73
ERYTHROCYTE [DISTWIDTH] IN BLOOD BY AUTOMATED COUNT: 13.8 % (ref 12.3–15.4)
GLUCOSE SERPL-MCNC: 90 MG/DL (ref 65–99)
HCT VFR BLD AUTO: 32 % (ref 34–46.6)
HGB BLD-MCNC: 10 G/DL (ref 12–15.9)
MCH RBC QN AUTO: 26.9 PG (ref 26.6–33)
MCHC RBC AUTO-ENTMCNC: 31.3 G/DL (ref 31.5–35.7)
MCV RBC AUTO: 86 FL (ref 79–97)
PLATELET # BLD AUTO: 276 10*3/MM3 (ref 140–450)
PMV BLD AUTO: 9.6 FL (ref 6–12)
POTASSIUM SERPL-SCNC: 4.1 MMOL/L (ref 3.5–5.2)
RBC # BLD AUTO: 3.72 10*6/MM3 (ref 3.77–5.28)
SODIUM SERPL-SCNC: 139 MMOL/L (ref 136–145)
WBC NRBC COR # BLD AUTO: 5.39 10*3/MM3 (ref 3.4–10.8)

## 2024-05-23 PROCEDURE — 36415 COLL VENOUS BLD VENIPUNCTURE: CPT

## 2024-05-23 PROCEDURE — 80048 BASIC METABOLIC PNL TOTAL CA: CPT

## 2024-05-23 PROCEDURE — 71250 CT THORAX DX C-: CPT

## 2024-05-23 PROCEDURE — 85027 COMPLETE CBC AUTOMATED: CPT

## 2024-05-23 RX ORDER — LORATADINE 10 MG/1
10 TABLET ORAL DAILY
Qty: 90 TABLET | Refills: 3 | Status: SHIPPED | OUTPATIENT
Start: 2024-05-23

## 2024-05-23 RX ORDER — ALBUTEROL SULFATE 90 UG/1
2 AEROSOL, METERED RESPIRATORY (INHALATION) EVERY 4 HOURS PRN
Qty: 6.7 G | Refills: 5 | Status: SHIPPED | OUTPATIENT
Start: 2024-05-23

## 2024-05-23 RX ORDER — FLUTICASONE PROPIONATE 50 MCG
2 SPRAY, SUSPENSION (ML) NASAL DAILY
Qty: 16 G | Refills: 5 | Status: SHIPPED | OUTPATIENT
Start: 2024-05-23 | End: 2024-05-23

## 2024-05-23 NOTE — TELEPHONE ENCOUNTER
Patient is scheduled for Bronchoscopy w/ ION Robot & EBUS on Wednesday 05/29/24 to follow your first case.

## 2024-05-23 NOTE — H&P (VIEW-ONLY)
RESPIRATORY DISEASE CLINIC NEW CONSULT NOTE     Patient: Ember Cedeno      :  1965   Age: 58 y.o.    Date of Service: May 23, 2024      Subjective:   Requesting Physician: Chiqui Marion APRN     Reason for Consultation:    Diagnosis Plan   1. Mass of middle lobe of right lung  CT Chest Without Contrast Diagnostic      2. Ductal carcinoma in situ (DCIS) of right breast        3. Former smoker        4. Other chronic pain        5. Non-seasonal allergic rhinitis, unspecified trigger        6. History of COVID-19        7. Chronic obstructive pulmonary disease, unspecified COPD type  Spirometry with Diffusion Capacity & Lung Volumes    Spirometry with Diffusion Capacity & Lung Volumes      8. Pulmonary scarring        9. Other fatigue  Polysomnography 4 or More Parameters      10. HERMANN (obstructive sleep apnea)  Polysomnography 4 or More Parameters           Chief Complaint:     Chief Complaint   Patient presents with    Lung Mass       History of present illness: Ember Cedeno is a 58 y.o. female who presents to the office today to be seen for    Diagnosis Plan   1. Mass of middle lobe of right lung  CT Chest Without Contrast Diagnostic      2. Ductal carcinoma in situ (DCIS) of right breast        3. Former smoker        4. Other chronic pain        5. Non-seasonal allergic rhinitis, unspecified trigger        6. History of COVID-19        7. Chronic obstructive pulmonary disease, unspecified COPD type  Spirometry with Diffusion Capacity & Lung Volumes    Spirometry with Diffusion Capacity & Lung Volumes      8. Pulmonary scarring        9. Other fatigue  Polysomnography 4 or More Parameters      10. HERMANN (obstructive sleep apnea)  Polysomnography 4 or More Parameters         Other problems per record.  Patient is a very pleasant middle-aged  female who was seen in the pulmonary clinic as a new consult today.  She was referred to the clinic for abnormal CT scan of the chest.    Patient is a  former smoker and smoked for almost 10 years less than 1 pack a day.  She did quit smoking in 2024.this year.  She currently lives with her  in [place] but also lived in [place] for few years.  She has significant medical history of breast cancer on the right side which was treated with lumpectomy and radiation treatment.      She recently had some workup done including a CT scan of the chest which showed she had a right middle lobe lung mass which is peripherally located but there was some extension to the hilar area and also she was noted to have some chronic changes and scarring with bronchiectatic changes in the lung.  The CT scan of the chest is done in outside hospital which is [facility] this is followed by a PET scan which showed increased SUV uptake in the right middle lobe mass which extends to the hilar area as well which suggest the possibility of a primary lung neoplasm and the patient was sent to the pulmonary clinic for further evaluation.  In the CT scan of the chest she was noted to have some chronic changes and scarring with bronchiectatic changes in the lung.  Patient denies any hemoptysis but has some cough which is mostly clear expectoration or dry cough and there is also postnasal drip and sinus drainage.  Her  reported she has sleep disturbance with snoring and feels tired and exhausted during the daytime.  She also has some shortness of breath on exertion.  She walks with a  and wanted her workup done during the school break before June.  She had chronic pain problems and was on Percocet before but currently going through the pain management with the primary care provider and is on Suboxone treatment    Her other significant medical history other she had a breast cancer treatment done here in [place] with Dr. Motley and the radiation treatment was done in the [facility] in [place].  This was all done in 2017 but she did not have much  follow-up done afterwards.  Patient also reported she was vaccinated for COVID and had a mild COVID infection 2 years ago.  She is not on any allergy medications.  She was feeling tired most of the time and has chronic pain.  She uses Flexeril for the chronic pain and she was noted to have iron deficiency for which she gets iron replacement.  She is also started taking Ozempic for diabetic control.  She told me she is up-to-date on vaccinations and had no recent hospital admissions and ER visit and urgent care visit any other new complaints.  No recent PFT was done and 1 PFT done in the office today showed normal spirometry but mild restrictive dysfunction she also has normal diffusion capacity corrected for alveolar volume noted.    Past History  Past Medical History:   Diagnosis Date    Anemia     Anxiety     Breast cancer 2019    right    Depression     Diabetes mellitus     Disease of thyroid gland     Ductal carcinoma in situ (DCIS) of right breast 2019    Elevated cholesterol     GERD (gastroesophageal reflux disease)     Hx of radiation therapy     Hypertension     Lung cancer     Lung nodule     Neuropathy     Non-seasonal allergic rhinitis 2024     Past Surgical History:   Procedure Laterality Date    APPENDECTOMY      BILATERAL BREAST REDUCTION      BREAST BIOPSY Right 2019    BREAST LUMPECTOMY       SECTION      X2    CHOLECYSTECTOMY      ERCP      HERNIA REPAIR      HYSTERECTOMY      PARTIAL    KNEE ACL RECONSTRUCTION Right     MASTECTOMY W/ SENTINEL NODE BIOPSY Right 2019    Procedure: RIGHT NEEDLE DIRECTED PARTIAL MASTECTOMY, MAMMO GUIDED AND SENTINEL LYMPH NODE BIOPSY, RADIOLOGIST TO INJECT AND SCAN;  Surgeon: Ana Muñoz MD;  Location: [facility] OR;  Service: General    OOPHORECTOMY      REDUCTION MAMMAPLASTY       No Known Allergies  Current Outpatient Medications   Medication Sig Dispense Refill    buprenorphine-naloxone (SUBOXONE) 8-2 MG per SL tablet Place 1 tablet  under the tongue Daily.      cyclobenzaprine (FLEXERIL) 10 MG tablet Take 1 tablet by mouth 3 (Three) Times a Day As Needed for Muscle Spasms.      docusate sodium (Colace) 100 MG capsule Take 1 capsule by mouth Every 8 (Eight) Hours.      fenofibrate 160 MG tablet Take 1 tablet by mouth Daily.      ferrous sulfate 325 (65 FE) MG tablet Take 1 tablet by mouth Daily With Breakfast. 60 tablet 2    FLUoxetine (PROzac) 20 MG capsule Take 2 capsules by mouth Daily.      gabapentin (NEURONTIN) 800 MG tablet Take 1 tablet by mouth 4 (Four) Times a Day.      lamoTRIgine (LaMICtal) 100 MG tablet Take 1 tablet by mouth Daily.      levothyroxine (SYNTHROID, LEVOTHROID) 50 MCG tablet Take 0.5 tablets by mouth Daily.      lisinopril-hydrochlorothiazide (PRINZIDE,ZESTORETIC) 20-25 MG per tablet Take 1 tablet by mouth Daily.      omeprazole (priLOSEC) 20 MG capsule Take 1 capsule by mouth Daily.      rosuvastatin (CRESTOR) 20 MG tablet Take 1 tablet by mouth Daily.      Semaglutide,0.25 or 0.5MG/DOS, (Ozempic, 0.25 or 0.5 MG/DOSE,) 2 MG/1.5ML solution pen-injector Inject 0.5 mg under the skin into the appropriate area as directed 1 (One) Time Per Week.      simvastatin (ZOCOR) 40 MG tablet Take 1 tablet by mouth Every Night.      albuterol sulfate  (90 Base) MCG/ACT inhaler Inhale 2 puffs Every 4 (Four) Hours As Needed for Wheezing. 6.7 g 5    loratadine (CLARITIN) 10 MG tablet Take 1 tablet by mouth Daily. 90 tablet 3     No current facility-administered medications for this visit.     Social History     Socioeconomic History    Marital status:    Tobacco Use    Smoking status: Former     Current packs/day: 0.00     Average packs/day: 1 pack/day for 3.7 years (3.7 ttl pk-yrs)     Types: Cigarettes     Start date: 11/10/2019     Quit date: 2023     Years since quittin.7     Passive exposure: Past    Smokeless tobacco: Never   Vaping Use    Vaping status: Never Used   Substance and Sexual Activity    Alcohol  "use: No    Drug use: No    Sexual activity: Not Currently     Partners: Male     Birth control/protection: Hysterectomy     Family History   Problem Relation Age of Onset    No Known Problems Mother     Diabetes Father     Hypertension Father     No Known Problems Sister     No Known Problems Brother     No Known Problems Daughter     No Known Problems Son     No Known Problems Maternal Grandmother     No Known Problems Paternal Grandmother     No Known Problems Maternal Aunt     No Known Problems Paternal Aunt     No Known Problems Other     Breast cancer Neg Hx     BRCA 1/2 Neg Hx     Colon cancer Neg Hx     Endometrial cancer Neg Hx     Ovarian cancer Neg Hx      Immunization History   Administered Date(s) Administered    COVID-19 (MODERNA) 1st,2nd,3rd Dose Monovalent 01/20/2021, 02/19/2021, 01/13/2022    Influenza Injectable Mdck Pf Quad 09/07/2017, 09/07/2017    Tdap 09/07/2017       Review of Systems  A complete review of systems is performed and all other systems were reviewed and negative except as noted above in the HPI.  Review of Systems   Constitutional: Negative.  Positive for fatigue.   HENT:  Positive for congestion, postnasal drip and sinus pressure.    Eyes: Negative.    Respiratory:  Positive for cough, chest tightness and shortness of breath.    Cardiovascular: Negative.    Gastrointestinal: Negative.    Endocrine: Negative.    Genitourinary: Negative.    Musculoskeletal:  Positive for arthralgias, back pain and neck pain.   Skin: Negative.    Allergic/Immunologic: Positive for environmental allergies.   Neurological: Negative.    Hematological: Negative.    Psychiatric/Behavioral: Negative.           Objective:     Vital Signs:  /72   Pulse 96   Ht 165.1 cm (65\")   Wt 65 kg (143 lb 3.2 oz)   SpO2 97% Comment: RA  Breastfeeding No   BMI 23.83 kg/m²     Pulmonary Functions Testing Results:  PFT Values          5/23/2024    10:00   Pre Drug PFT Results      FEV1 110   FEF 25-75% " 126   FEV1/FVC 83.45   Other Tests PFT Results   TLC 90   RV 65   DLCO 90   D/VAsb 94     Results for orders placed in visit on 24    Spirometry with Diffusion Capacity & Lung Volumes    Narrative  Spirometry with Diffusion Capacity & Lung Volumes    Performed by: Guido Keating CMA  Authorized by: Flavio Kang MD  Pre Drug % Predicted  FVC: 107%  FEV1: 110%  FEF 25-75%: 126%  FEV1/FVC: 83.45%  T%  RV: 65%  DLCO: 90%  D/VAsb: 94%    Interpretation  Overall comments:  Above test results are acceptable and repeatable by ATS criteria.  Analysis of the above procedures patient showed evidence of normal spirometry.  There was mild restrictive dysfunction noted due to decrease in total lung capacity.  Residual volume was also decreased.  No bronchodilator challenge was done.    The diffusion capacity corrected for alveolar volume was within normal limits.  No prior test result was available for comparison clinical correlation was indicated    Flavio Kang MD  Pulmonologist/Intensivist  2024 13:58 CDT        Physical Exam  General:  Patient is a 58 y.o. pleasant middle aged  female. Looks states age. Appears to be in no acute distress.  Eyes:  EOMI.  PERRLA.  Vision intact.  No scleral icterus.    Ear, Nose, Mouth and Throat:  External inspection of the ears and nose appear to be normal.  No obvious scars or lesions.  Hearing is grossly intact.  No leukoplakia, pharyngitis, stomatitis or thrush. Swollen nasal mucosa with post nasal drip.   Neck:  Range of motion of neck normal.  No thyromegaly or masses. Malanpati Class 3, no jugular venous distention, no thyroid swelling  Respiratory:  Clear to auscultation bilaterally.  No use of accessory muscles. Decreased breath sounds.      Cardiovascular:  Regularly regular rhythm without S3, S4 or murmur.  No hepatojugular reflux nor carotid bruits.  Pulses intact in four extremities.  No obvious signs of edema.    Gastrointestinal:  Nontender,  nondistended, soft.  Bowel sounds positive in all four quadrants.  No organomegaly or masses nor bruit.    Lymphatic:  No significant adenopathy appreciated in the neck, axilla or elsewhere.    Musculoskeletal:  Gait was grossly intact.  Range of motion, strength and sensitivity of all four extremities appear to be intact.  Distal tendon reflexes intact.   Skin:  No obvious rashes, lesions, ulcers or large amount of bruising.    Neurological:  Refer to Eye, Ear, Nose and Throat and musculoskeletal exams for additional details.  Distal tendon reflexes intact.  No clonus or Babinski.  Sensation to touch is grossly intact.    Psychiatric:  Patient is alert and oriented to person, place and time.  Recent and remote memory appear to be intact.  Patient does not show outward signs of depression.      Diagnostic Findings:   Pertinent findings noted and abnormal findings also noted. Reviewed .      Chest Imaging      Study Result    Narrative & Impression   Exam: NM PET/CT SKULL BASE TO MID THIGH-     Indication: Initial staging/evaluation of lung nodule, remote history of  breast cancer     Comparison: Outside chest CT 4/16/2024     12.49 mCi F-18 FDG was administered IV per protocol via the left  antecubital fossa. Blood glucose at the time of injection was 106 mg/dl.     PET/CT images were obtained from the base of the skull to the proximal  femurs approximately 60 minutes after radiotracer administration.  Noncontrast CT images of the neck, chest, abdomen, and pelvis were  obtained for attenuation correction and anatomic localization. DLP: 746.  Automated exposure control was also utilized to decrease patient  radiation dose.     Findings:  Background right hepatic lobe metabolic activity measures max SUV 2.2.     4.0 x 2.0 cm hypermetabolic mass centered in the right middle lobe with  max SUV 6.5. Bandlike consolidation extends medially from this mass into  the right hilar region and into the right lower lobe lobe  abnormal  metabolic activity does not track along with these areas of  consolidation. No other hypermetabolic pulmonary nodule/mass. No  hypermetabolic thoracic lymph nodes.     No suspicious hypermetabolic activity in the neck. No suspicious  hypermetabolic activity in the abdomen or pelvis. No hypermetabolic bone  lesion.     Non-FDG avid findings:     Included lower intracranial cavity is unremarkable. No acute orbital  finding. Parotid glands appear unremarkable. 7 mm left thyroid nodule.  No enlarged cervical lymph nodes. Mastoid air cells and paranasal  sinuses are clear. Multilevel degenerative change and postoperative  change in the cervical spine.     Central airways are clear. Patchy groundglass opacities are present in  both lungs. No pleural effusion. No enlarged thoracic lymph nodes.  Thoracic aorta is nonaneurysmal. No pericardial effusion.     Unenhanced liver, biliary tree, pancreas, spleen, and adrenal glands are  unremarkable. Prior cholecystectomy with mild reservoir phenomenon  noted. No urolithiasis or hydronephrosis. No focal urinary bladder wall  thickening.      Large volume stool in the colon. No bowel obstruction or evidence of  active bowel inflammation. No ascites or free pelvic fluid. No pelvic  mass or pelvic collection. Prior hysterectomy. Nonaneurysmal abdominal  aorta. No enlarged abdominal or pelvic lymph nodes.     IMPRESSION:     1.  4.0 x 2.0 cm hypermetabolic mass centered in the right middle lobe  with bandlike areas of consolidation extending into the right hilar  region and into the right lower lobe. This is most concerning for a  primary lung neoplasm. Differential diagnosis does also include an  infectious or inflammatory process but a primary lung neoplasm needs to  first be excluded.     2.  No hypermetabolic thoracic lymph nodes. No evidence of  hypermetabolic metastatic disease in the neck, chest, abdomen, or pelvis     3.  Patchy groundglass opacities in both lungs  like related to an  inflammatory or infectious process.     4.  Large volume stool throughout the colon     This report was signed and finalized on 5/10/2024 1:43 PM by Dr. Gamal Palacios MD.        Assessment      1. Mass of middle lobe of right lung    2. Ductal carcinoma in situ (DCIS) of right breast    3. Former smoker    4. Other chronic pain    5. Non-seasonal allergic rhinitis, unspecified trigger    6. History of COVID-19    7. Chronic obstructive pulmonary disease, unspecified COPD type    8. Pulmonary scarring    9. Other fatigue    10. HERMANN (obstructive sleep apnea)        Plan/Recommendations     1.  I reviewed the CT scan chest from outside hospital and the PET scan done in the [facility].  Both lung and the right middle lobe lung mass which is fairly large is most likely a primary lung neoplasm and less likely pneumonia but a tissue diagnosis will be necessary.  This mass also has some extension up to the right hilar area and some particular and paratracheal lymph nodes.  I showed the results to the patient and her  and discussed the findings with him.  2.  After reviewing the results it appears patient will be a good candidate for a ION bronchoscopy and EBUS bronchoscopy with biopsy and I scheduled her with Dr. Hodge next Wednesday after talking to Dr. Hodge and he also agreed with the planned procedure.  Will do the preprocedure workup as scheduled and pulmonary function testing already done.  She will get another CT scan of the chest with a navigational bronchoscopy protocol soon.  3.  Once we get the diagnosis then we will decide whether patient will be a good surgical candidate or she will be seen by oncology and then will be decided about the chemotherapy or radiation treatment.  She already had a history of breast cancer with radiation treatment and due to the size of the lung mass it will be difficult to get a lobectomy done although the always there could be a possibility but will wait  for the biopsy results for the staging procedure to make further recommendations.  I have not made the oncology referral yet.  4.  Patient has COPD and some chronic changes likely bronchiectasis noted in the CT scan of the chest although her spirometry appears to be normal.  Reviewed the lung function test done today and explained the results with the patient.  She will be started on albuterol rescue inhaler but no long-term inhaler is needed at this time.  5.  Patient has nasal allergy which is probably causing the chronic cough and postnasal drainage and she was started on fluticasone nasal spray and loratadine.  She is also noted to have significant sleep disturbance and likely have sleep apnea from the clinical presentation and I ordered a sleep study and if needed we will start her on CPAP treatment and weight loss and lifestyle was discussed with the patient.  6.  Patient is up-to-date on her vaccinations.  She will continue follow-up with the primary care provider for other medical issues and advised to return to pulmonary clinic for follow-up visit in 2 months time after the biopsy is done for further evaluation and recommendations.  Current plan and the test results were discussed with the patient and her  in details and all questions were answered to their satisfaction.    Follow Up    2 months    Time Spent   45 minutes      I appreciate the opportunity of participating in this patients care. I would like to thank the PCP for the referral. Please feel free to contact me with any other questions.       Flavio Kang MD   Pulmonologist/Intensivist     13:59 CDT

## 2024-05-23 NOTE — DISCHARGE INSTRUCTIONS
Preparing for Surgery  Follow these instructions before the procedure:  Several days or weeks before your procedure  Medication(s) you need to stop   _____1 week prior to surgery  OZEMPIC      Ask your health care provider about:  Changing or stopping your regular medicines. This is especially important if you are taking diabetes medicines or blood thinners.  Taking medicines such as aspirin and ibuprofen. These medicines can thin your blood. Do not take these medicines unless your health care provider tells you to take them.  Taking over-the-counter medicines, vitamins, herbs, and supplements.    Contact your surgeon if you:  Develop a fever of more than 100.4°F (38°C) or other feelings of illness during the 48 hours before your surgery.  Have symptoms that get worse.  Have questions or concerns about your surgery.  If you are going home the same day of your surgery you will need to arrange for a responsible adult, age 18 years old or older, to drive you home from the hospital and stay with you for 24 hours. Verification of the  will be made prior to any procedure requiring sedation. You may not go home in a taxi or any form of public transportation by yourself.     Day before your procedure  Medication(s) you need to stop the day before your surgery: SUBOXONE, AND LISINOPRIL/HCTZ    24 hours before your procedure DO NOT drink alcoholic beverages or smoke.  24 hours before your procedure STOP taking Erectile Dysfunction medication (i.e.,Cialis, Viagra)   You may be asked to shower with a germ-killing soap.  Day of your procedure   You may take the following medication(s) the morning of surgery with a sip of water:  OMEPRAZOLE AND GABAPENTIN      8 hours before your procedure STOP all food, any dairy products, and full liquids. This includes hard candy, chewing gum or mints. This is extremely important to prevent serious complications.     Up to 2 hours before your scheduled arrival time, you may have clear  liquids no cream, powder, or pulp of any kind. Safe options are water, black coffee, plain tea, soda, Gatorade/Powerade, clear broth, apple juice.    2 hours before your scheduled arrival time, STOP drinking clear liquids.    You may need to take another shower with a germ-killing soap before you leave home in the morning. Do not use perfumes, colognes, or body lotions.  Wear comfortable loose-fitting clothing.  Remove all jewelry including body piercing and rings, dark colored nail polish, and make up prior to arrival at the hospital. Leave all valuables at home.   Bring your hearing aids if you rely on them.  Do not wear contact lenses. If you wear eyeglasses remember to bring a case to store them in while you are in surgery.  Do not use denture adhesives since you will be asked to remove them during your surgery.    You do not need to bring your home medications into the hospital.   Bring your sleep apnea device with you on the day of your surgery (if this applies to you).  If you wear portable oxygen, bring it with you.   If you are staying overnight, you may bring a bag of items you may need such as slippers, robe and a change of clothes for your discharge. You may want to leave these items in the car until you are ready for them since your family will take your belongings when you leave the pre-operative area.  Arrive at the hospital as scheduled by the office. You will be asked to arrive 2 hours prior to your surgery time in order to prepare for your procedure.  When you arrive at the hospital  Go to the registration desk located at the main entrance of the hospital.  After registration is completed, you will be given a beeper and a sticker sheet. Take the stickers to Outpatient Surgery and place in the tray at the end of the desk to notify the staff that you have arrived and registered.   Return to the lobby to wait. You are not always called back according to the time of arrival but rather the time your  doctor will be ready.  When your beeper lights up and vibrates proceed through the double doors, under the stairs, and a member of the Outpatient Surgery staff will escort you to your preoperative room.

## 2024-05-23 NOTE — PROCEDURES
Spirometry with Diffusion Capacity & Lung Volumes    Performed by: Guido Keating CMA  Authorized by: Flavio Kang MD     Pre Drug % Predicted    FVC: 107%   FEV1: 110%   FEF 25-75%: 126%   FEV1/FVC: 83.45%   T%   RV: 65%   DLCO: 90%   D/VAsb: 94%    Interpretation   Overall comments:   Above test results are acceptable and repeatable by ATS criteria.  Analysis of the above procedures patient showed evidence of normal spirometry.  There was mild restrictive dysfunction noted due to decrease in total lung capacity.  Residual volume was also decreased.  No bronchodilator challenge was done.    The diffusion capacity corrected for alveolar volume was within normal limits.  No prior test result was available for comparison clinical correlation was indicated    Flavio Kang MD  Pulmonologist/Intensivist  2024 13:58 CDT

## 2024-05-23 NOTE — PROGRESS NOTES
RESPIRATORY DISEASE CLINIC NEW CONSULT NOTE     Patient: Ember Cedeno      :  1965   Age: 58 y.o.    Date of Service: May 23, 2024      Subjective:   Requesting Physician: Chiqui Marion APRN     Reason for Consultation:    Diagnosis Plan   1. Mass of middle lobe of right lung  CT Chest Without Contrast Diagnostic      2. Ductal carcinoma in situ (DCIS) of right breast        3. Former smoker        4. Other chronic pain        5. Non-seasonal allergic rhinitis, unspecified trigger        6. History of COVID-19        7. Chronic obstructive pulmonary disease, unspecified COPD type  Spirometry with Diffusion Capacity & Lung Volumes    Spirometry with Diffusion Capacity & Lung Volumes      8. Pulmonary scarring        9. Other fatigue  Polysomnography 4 or More Parameters      10. HERMANN (obstructive sleep apnea)  Polysomnography 4 or More Parameters           Chief Complaint:     Chief Complaint   Patient presents with    Lung Mass       History of present illness: Ember Cedeno is a 58 y.o. female who presents to the office today to be seen for    Diagnosis Plan   1. Mass of middle lobe of right lung  CT Chest Without Contrast Diagnostic      2. Ductal carcinoma in situ (DCIS) of right breast        3. Former smoker        4. Other chronic pain        5. Non-seasonal allergic rhinitis, unspecified trigger        6. History of COVID-19        7. Chronic obstructive pulmonary disease, unspecified COPD type  Spirometry with Diffusion Capacity & Lung Volumes    Spirometry with Diffusion Capacity & Lung Volumes      8. Pulmonary scarring        9. Other fatigue  Polysomnography 4 or More Parameters      10. HERMANN (obstructive sleep apnea)  Polysomnography 4 or More Parameters         Other problems per record.  Patient is a very pleasant middle-aged  female who was seen in the pulmonary clinic as a new consult today.  She was referred to the clinic for abnormal CT scan of the chest.    Patient is a  former smoker and smoked for almost 10 years less than 1 pack a day.  She did quit smoking in 2024.this year.  She currently lives with her  in [place] but also lived in [place] for few years.  She has significant medical history of breast cancer on the right side which was treated with lumpectomy and radiation treatment.      She recently had some workup done including a CT scan of the chest which showed she had a right middle lobe lung mass which is peripherally located but there was some extension to the hilar area and also she was noted to have some chronic changes and scarring with bronchiectatic changes in the lung.  The CT scan of the chest is done in outside hospital which is [facility] this is followed by a PET scan which showed increased SUV uptake in the right middle lobe mass which extends to the hilar area as well which suggest the possibility of a primary lung neoplasm and the patient was sent to the pulmonary clinic for further evaluation.  In the CT scan of the chest she was noted to have some chronic changes and scarring with bronchiectatic changes in the lung.  Patient denies any hemoptysis but has some cough which is mostly clear expectoration or dry cough and there is also postnasal drip and sinus drainage.  Her  reported she has sleep disturbance with snoring and feels tired and exhausted during the daytime.  She also has some shortness of breath on exertion.  She walks with a  and wanted her workup done during the school break before June.  She had chronic pain problems and was on Percocet before but currently going through the pain management with the primary care provider and is on Suboxone treatment    Her other significant medical history other she had a breast cancer treatment done here in [place] with Dr. Motley and the radiation treatment was done in the [facility] in [place].  This was all done in 2017 but she did not have much  follow-up done afterwards.  Patient also reported she was vaccinated for COVID and had a mild COVID infection 2 years ago.  She is not on any allergy medications.  She was feeling tired most of the time and has chronic pain.  She uses Flexeril for the chronic pain and she was noted to have iron deficiency for which she gets iron replacement.  She is also started taking Ozempic for diabetic control.  She told me she is up-to-date on vaccinations and had no recent hospital admissions and ER visit and urgent care visit any other new complaints.  No recent PFT was done and 1 PFT done in the office today showed normal spirometry but mild restrictive dysfunction she also has normal diffusion capacity corrected for alveolar volume noted.    Past History  Past Medical History:   Diagnosis Date    Anemia     Anxiety     Breast cancer 2019    right    Depression     Diabetes mellitus     Disease of thyroid gland     Ductal carcinoma in situ (DCIS) of right breast 2019    Elevated cholesterol     GERD (gastroesophageal reflux disease)     Hx of radiation therapy     Hypertension     Lung cancer     Lung nodule     Neuropathy     Non-seasonal allergic rhinitis 2024     Past Surgical History:   Procedure Laterality Date    APPENDECTOMY      BILATERAL BREAST REDUCTION      BREAST BIOPSY Right 2019    BREAST LUMPECTOMY       SECTION      X2    CHOLECYSTECTOMY      ERCP      HERNIA REPAIR      HYSTERECTOMY      PARTIAL    KNEE ACL RECONSTRUCTION Right     MASTECTOMY W/ SENTINEL NODE BIOPSY Right 2019    Procedure: RIGHT NEEDLE DIRECTED PARTIAL MASTECTOMY, MAMMO GUIDED AND SENTINEL LYMPH NODE BIOPSY, RADIOLOGIST TO INJECT AND SCAN;  Surgeon: Ana Muñoz MD;  Location: [facility] OR;  Service: General    OOPHORECTOMY      REDUCTION MAMMAPLASTY       No Known Allergies  Current Outpatient Medications   Medication Sig Dispense Refill    buprenorphine-naloxone (SUBOXONE) 8-2 MG per SL tablet Place 1 tablet  under the tongue Daily.      cyclobenzaprine (FLEXERIL) 10 MG tablet Take 1 tablet by mouth 3 (Three) Times a Day As Needed for Muscle Spasms.      docusate sodium (Colace) 100 MG capsule Take 1 capsule by mouth Every 8 (Eight) Hours.      fenofibrate 160 MG tablet Take 1 tablet by mouth Daily.      ferrous sulfate 325 (65 FE) MG tablet Take 1 tablet by mouth Daily With Breakfast. 60 tablet 2    FLUoxetine (PROzac) 20 MG capsule Take 2 capsules by mouth Daily.      gabapentin (NEURONTIN) 800 MG tablet Take 1 tablet by mouth 4 (Four) Times a Day.      lamoTRIgine (LaMICtal) 100 MG tablet Take 1 tablet by mouth Daily.      levothyroxine (SYNTHROID, LEVOTHROID) 50 MCG tablet Take 0.5 tablets by mouth Daily.      lisinopril-hydrochlorothiazide (PRINZIDE,ZESTORETIC) 20-25 MG per tablet Take 1 tablet by mouth Daily.      omeprazole (priLOSEC) 20 MG capsule Take 1 capsule by mouth Daily.      rosuvastatin (CRESTOR) 20 MG tablet Take 1 tablet by mouth Daily.      Semaglutide,0.25 or 0.5MG/DOS, (Ozempic, 0.25 or 0.5 MG/DOSE,) 2 MG/1.5ML solution pen-injector Inject 0.5 mg under the skin into the appropriate area as directed 1 (One) Time Per Week.      simvastatin (ZOCOR) 40 MG tablet Take 1 tablet by mouth Every Night.      albuterol sulfate  (90 Base) MCG/ACT inhaler Inhale 2 puffs Every 4 (Four) Hours As Needed for Wheezing. 6.7 g 5    loratadine (CLARITIN) 10 MG tablet Take 1 tablet by mouth Daily. 90 tablet 3     No current facility-administered medications for this visit.     Social History     Socioeconomic History    Marital status:    Tobacco Use    Smoking status: Former     Current packs/day: 0.00     Average packs/day: 1 pack/day for 3.7 years (3.7 ttl pk-yrs)     Types: Cigarettes     Start date: 11/10/2019     Quit date: 2023     Years since quittin.7     Passive exposure: Past    Smokeless tobacco: Never   Vaping Use    Vaping status: Never Used   Substance and Sexual Activity    Alcohol  "use: No    Drug use: No    Sexual activity: Not Currently     Partners: Male     Birth control/protection: Hysterectomy     Family History   Problem Relation Age of Onset    No Known Problems Mother     Diabetes Father     Hypertension Father     No Known Problems Sister     No Known Problems Brother     No Known Problems Daughter     No Known Problems Son     No Known Problems Maternal Grandmother     No Known Problems Paternal Grandmother     No Known Problems Maternal Aunt     No Known Problems Paternal Aunt     No Known Problems Other     Breast cancer Neg Hx     BRCA 1/2 Neg Hx     Colon cancer Neg Hx     Endometrial cancer Neg Hx     Ovarian cancer Neg Hx      Immunization History   Administered Date(s) Administered    COVID-19 (MODERNA) 1st,2nd,3rd Dose Monovalent 01/20/2021, 02/19/2021, 01/13/2022    Influenza Injectable Mdck Pf Quad 09/07/2017, 09/07/2017    Tdap 09/07/2017       Review of Systems  A complete review of systems is performed and all other systems were reviewed and negative except as noted above in the HPI.  Review of Systems   Constitutional: Negative.  Positive for fatigue.   HENT:  Positive for congestion, postnasal drip and sinus pressure.    Eyes: Negative.    Respiratory:  Positive for cough, chest tightness and shortness of breath.    Cardiovascular: Negative.    Gastrointestinal: Negative.    Endocrine: Negative.    Genitourinary: Negative.    Musculoskeletal:  Positive for arthralgias, back pain and neck pain.   Skin: Negative.    Allergic/Immunologic: Positive for environmental allergies.   Neurological: Negative.    Hematological: Negative.    Psychiatric/Behavioral: Negative.           Objective:     Vital Signs:  /72   Pulse 96   Ht 165.1 cm (65\")   Wt 65 kg (143 lb 3.2 oz)   SpO2 97% Comment: RA  Breastfeeding No   BMI 23.83 kg/m²     Pulmonary Functions Testing Results:  PFT Values          5/23/2024    10:00   Pre Drug PFT Results      FEV1 110   FEF 25-75% " 126   FEV1/FVC 83.45   Other Tests PFT Results   TLC 90   RV 65   DLCO 90   D/VAsb 94     Results for orders placed in visit on 24    Spirometry with Diffusion Capacity & Lung Volumes    Narrative  Spirometry with Diffusion Capacity & Lung Volumes    Performed by: Guido Keating CMA  Authorized by: Flavio Kang MD  Pre Drug % Predicted  FVC: 107%  FEV1: 110%  FEF 25-75%: 126%  FEV1/FVC: 83.45%  T%  RV: 65%  DLCO: 90%  D/VAsb: 94%    Interpretation  Overall comments:  Above test results are acceptable and repeatable by ATS criteria.  Analysis of the above procedures patient showed evidence of normal spirometry.  There was mild restrictive dysfunction noted due to decrease in total lung capacity.  Residual volume was also decreased.  No bronchodilator challenge was done.    The diffusion capacity corrected for alveolar volume was within normal limits.  No prior test result was available for comparison clinical correlation was indicated    Flavio Kang MD  Pulmonologist/Intensivist  2024 13:58 CDT        Physical Exam  General:  Patient is a 58 y.o. pleasant middle aged  female. Looks states age. Appears to be in no acute distress.  Eyes:  EOMI.  PERRLA.  Vision intact.  No scleral icterus.    Ear, Nose, Mouth and Throat:  External inspection of the ears and nose appear to be normal.  No obvious scars or lesions.  Hearing is grossly intact.  No leukoplakia, pharyngitis, stomatitis or thrush. Swollen nasal mucosa with post nasal drip.   Neck:  Range of motion of neck normal.  No thyromegaly or masses. Malanpati Class 3, no jugular venous distention, no thyroid swelling  Respiratory:  Clear to auscultation bilaterally.  No use of accessory muscles. Decreased breath sounds.      Cardiovascular:  Regularly regular rhythm without S3, S4 or murmur.  No hepatojugular reflux nor carotid bruits.  Pulses intact in four extremities.  No obvious signs of edema.    Gastrointestinal:  Nontender,  nondistended, soft.  Bowel sounds positive in all four quadrants.  No organomegaly or masses nor bruit.    Lymphatic:  No significant adenopathy appreciated in the neck, axilla or elsewhere.    Musculoskeletal:  Gait was grossly intact.  Range of motion, strength and sensitivity of all four extremities appear to be intact.  Distal tendon reflexes intact.   Skin:  No obvious rashes, lesions, ulcers or large amount of bruising.    Neurological:  Refer to Eye, Ear, Nose and Throat and musculoskeletal exams for additional details.  Distal tendon reflexes intact.  No clonus or Babinski.  Sensation to touch is grossly intact.    Psychiatric:  Patient is alert and oriented to person, place and time.  Recent and remote memory appear to be intact.  Patient does not show outward signs of depression.      Diagnostic Findings:   Pertinent findings noted and abnormal findings also noted. Reviewed .      Chest Imaging      Study Result    Narrative & Impression   Exam: NM PET/CT SKULL BASE TO MID THIGH-     Indication: Initial staging/evaluation of lung nodule, remote history of  breast cancer     Comparison: Outside chest CT 4/16/2024     12.49 mCi F-18 FDG was administered IV per protocol via the left  antecubital fossa. Blood glucose at the time of injection was 106 mg/dl.     PET/CT images were obtained from the base of the skull to the proximal  femurs approximately 60 minutes after radiotracer administration.  Noncontrast CT images of the neck, chest, abdomen, and pelvis were  obtained for attenuation correction and anatomic localization. DLP: 746.  Automated exposure control was also utilized to decrease patient  radiation dose.     Findings:  Background right hepatic lobe metabolic activity measures max SUV 2.2.     4.0 x 2.0 cm hypermetabolic mass centered in the right middle lobe with  max SUV 6.5. Bandlike consolidation extends medially from this mass into  the right hilar region and into the right lower lobe lobe  abnormal  metabolic activity does not track along with these areas of  consolidation. No other hypermetabolic pulmonary nodule/mass. No  hypermetabolic thoracic lymph nodes.     No suspicious hypermetabolic activity in the neck. No suspicious  hypermetabolic activity in the abdomen or pelvis. No hypermetabolic bone  lesion.     Non-FDG avid findings:     Included lower intracranial cavity is unremarkable. No acute orbital  finding. Parotid glands appear unremarkable. 7 mm left thyroid nodule.  No enlarged cervical lymph nodes. Mastoid air cells and paranasal  sinuses are clear. Multilevel degenerative change and postoperative  change in the cervical spine.     Central airways are clear. Patchy groundglass opacities are present in  both lungs. No pleural effusion. No enlarged thoracic lymph nodes.  Thoracic aorta is nonaneurysmal. No pericardial effusion.     Unenhanced liver, biliary tree, pancreas, spleen, and adrenal glands are  unremarkable. Prior cholecystectomy with mild reservoir phenomenon  noted. No urolithiasis or hydronephrosis. No focal urinary bladder wall  thickening.      Large volume stool in the colon. No bowel obstruction or evidence of  active bowel inflammation. No ascites or free pelvic fluid. No pelvic  mass or pelvic collection. Prior hysterectomy. Nonaneurysmal abdominal  aorta. No enlarged abdominal or pelvic lymph nodes.     IMPRESSION:     1.  4.0 x 2.0 cm hypermetabolic mass centered in the right middle lobe  with bandlike areas of consolidation extending into the right hilar  region and into the right lower lobe. This is most concerning for a  primary lung neoplasm. Differential diagnosis does also include an  infectious or inflammatory process but a primary lung neoplasm needs to  first be excluded.     2.  No hypermetabolic thoracic lymph nodes. No evidence of  hypermetabolic metastatic disease in the neck, chest, abdomen, or pelvis     3.  Patchy groundglass opacities in both lungs  like related to an  inflammatory or infectious process.     4.  Large volume stool throughout the colon     This report was signed and finalized on 5/10/2024 1:43 PM by Dr. Gamal Palacios MD.        Assessment      1. Mass of middle lobe of right lung    2. Ductal carcinoma in situ (DCIS) of right breast    3. Former smoker    4. Other chronic pain    5. Non-seasonal allergic rhinitis, unspecified trigger    6. History of COVID-19    7. Chronic obstructive pulmonary disease, unspecified COPD type    8. Pulmonary scarring    9. Other fatigue    10. HERMANN (obstructive sleep apnea)        Plan/Recommendations     1.  I reviewed the CT scan chest from outside hospital and the PET scan done in the [facility].  Both lung and the right middle lobe lung mass which is fairly large is most likely a primary lung neoplasm and less likely pneumonia but a tissue diagnosis will be necessary.  This mass also has some extension up to the right hilar area and some particular and paratracheal lymph nodes.  I showed the results to the patient and her  and discussed the findings with him.  2.  After reviewing the results it appears patient will be a good candidate for a ION bronchoscopy and EBUS bronchoscopy with biopsy and I scheduled her with Dr. Hodge next Wednesday after talking to Dr. Hodge and he also agreed with the planned procedure.  Will do the preprocedure workup as scheduled and pulmonary function testing already done.  She will get another CT scan of the chest with a navigational bronchoscopy protocol soon.  3.  Once we get the diagnosis then we will decide whether patient will be a good surgical candidate or she will be seen by oncology and then will be decided about the chemotherapy or radiation treatment.  She already had a history of breast cancer with radiation treatment and due to the size of the lung mass it will be difficult to get a lobectomy done although the always there could be a possibility but will wait  for the biopsy results for the staging procedure to make further recommendations.  I have not made the oncology referral yet.  4.  Patient has COPD and some chronic changes likely bronchiectasis noted in the CT scan of the chest although her spirometry appears to be normal.  Reviewed the lung function test done today and explained the results with the patient.  She will be started on albuterol rescue inhaler but no long-term inhaler is needed at this time.  5.  Patient has nasal allergy which is probably causing the chronic cough and postnasal drainage and she was started on fluticasone nasal spray and loratadine.  She is also noted to have significant sleep disturbance and likely have sleep apnea from the clinical presentation and I ordered a sleep study and if needed we will start her on CPAP treatment and weight loss and lifestyle was discussed with the patient.  6.  Patient is up-to-date on her vaccinations.  She will continue follow-up with the primary care provider for other medical issues and advised to return to pulmonary clinic for follow-up visit in 2 months time after the biopsy is done for further evaluation and recommendations.  Current plan and the test results were discussed with the patient and her  in details and all questions were answered to their satisfaction.    Follow Up    2 months    Time Spent   45 minutes      I appreciate the opportunity of participating in this patients care. I would like to thank the PCP for the referral. Please feel free to contact me with any other questions.       Flavio Kang MD   Pulmonologist/Intensivist     13:59 CDT

## 2024-05-29 ENCOUNTER — ANESTHESIA (OUTPATIENT)
Dept: PERIOP | Facility: HOSPITAL | Age: 59
End: 2024-05-29
Payer: COMMERCIAL

## 2024-05-29 ENCOUNTER — APPOINTMENT (OUTPATIENT)
Dept: GENERAL RADIOLOGY | Facility: HOSPITAL | Age: 59
End: 2024-05-29
Payer: COMMERCIAL

## 2024-05-29 ENCOUNTER — HOSPITAL ENCOUNTER (OUTPATIENT)
Facility: HOSPITAL | Age: 59
Setting detail: HOSPITAL OUTPATIENT SURGERY
Discharge: HOME | End: 2024-05-29
Attending: INTERNAL MEDICINE | Admitting: INTERNAL MEDICINE
Payer: COMMERCIAL

## 2024-05-29 ENCOUNTER — ANESTHESIA EVENT (OUTPATIENT)
Dept: PERIOP | Facility: HOSPITAL | Age: 59
End: 2024-05-29
Payer: COMMERCIAL

## 2024-05-29 VITALS
SYSTOLIC BLOOD PRESSURE: 122 MMHG | OXYGEN SATURATION: 93 % | HEART RATE: 75 BPM | TEMPERATURE: 97.2 F | RESPIRATION RATE: 16 BRPM | DIASTOLIC BLOOD PRESSURE: 73 MMHG

## 2024-05-29 DIAGNOSIS — R91.8 MASS OF MIDDLE LOBE OF RIGHT LUNG: ICD-10-CM

## 2024-05-29 LAB
GLUCOSE BLDC GLUCOMTR-MCNC: 124 MG/DL (ref 70–130)
GLUCOSE BLDC GLUCOMTR-MCNC: 130 MG/DL (ref 70–130)
KOH PREP NAIL: NORMAL

## 2024-05-29 PROCEDURE — 87220 TISSUE EXAM FOR FUNGI: CPT | Performed by: INTERNAL MEDICINE

## 2024-05-29 PROCEDURE — 87206 SMEAR FLUORESCENT/ACID STAI: CPT | Performed by: INTERNAL MEDICINE

## 2024-05-29 PROCEDURE — 87070 CULTURE OTHR SPECIMN AEROBIC: CPT | Performed by: INTERNAL MEDICINE

## 2024-05-29 PROCEDURE — 25010000002 DEXAMETHASONE PER 1 MG: Performed by: ANESTHESIOLOGY

## 2024-05-29 PROCEDURE — 25810000003 LACTATED RINGERS PER 1000 ML: Performed by: INTERNAL MEDICINE

## 2024-05-29 PROCEDURE — C1726 CATH, BAL DIL, NON-VASCULAR: HCPCS | Performed by: INTERNAL MEDICINE

## 2024-05-29 PROCEDURE — 31628 BRONCHOSCOPY/LUNG BX EACH: CPT | Performed by: INTERNAL MEDICINE

## 2024-05-29 PROCEDURE — 71045 X-RAY EXAM CHEST 1 VIEW: CPT

## 2024-05-29 PROCEDURE — 25010000002 ONDANSETRON PER 1 MG: Performed by: NURSE ANESTHETIST, CERTIFIED REGISTERED

## 2024-05-29 PROCEDURE — 31624 DX BRONCHOSCOPE/LAVAGE: CPT | Performed by: INTERNAL MEDICINE

## 2024-05-29 PROCEDURE — 88112 CYTOPATH CELL ENHANCE TECH: CPT | Performed by: INTERNAL MEDICINE

## 2024-05-29 PROCEDURE — 25010000002 MIDAZOLAM PER 1 MG: Performed by: ANESTHESIOLOGY

## 2024-05-29 PROCEDURE — 87116 MYCOBACTERIA CULTURE: CPT | Performed by: INTERNAL MEDICINE

## 2024-05-29 PROCEDURE — 87205 SMEAR GRAM STAIN: CPT | Performed by: INTERNAL MEDICINE

## 2024-05-29 PROCEDURE — 25010000002 SUGAMMADEX 200 MG/2ML SOLUTION: Performed by: NURSE ANESTHETIST, CERTIFIED REGISTERED

## 2024-05-29 PROCEDURE — 87102 FUNGUS ISOLATION CULTURE: CPT | Performed by: INTERNAL MEDICINE

## 2024-05-29 PROCEDURE — 25010000002 FENTANYL CITRATE (PF) 100 MCG/2ML SOLUTION: Performed by: NURSE ANESTHETIST, CERTIFIED REGISTERED

## 2024-05-29 PROCEDURE — 31654 BRONCH EBUS IVNTJ PERPH LES: CPT | Performed by: INTERNAL MEDICINE

## 2024-05-29 PROCEDURE — 82948 REAGENT STRIP/BLOOD GLUCOSE: CPT

## 2024-05-29 PROCEDURE — 76000 FLUOROSCOPY <1 HR PHYS/QHP: CPT

## 2024-05-29 PROCEDURE — 31629 BRONCHOSCOPY/NEEDLE BX EACH: CPT | Performed by: INTERNAL MEDICINE

## 2024-05-29 PROCEDURE — 31627 NAVIGATIONAL BRONCHOSCOPY: CPT | Performed by: INTERNAL MEDICINE

## 2024-05-29 PROCEDURE — 25010000002 PROPOFOL 10 MG/ML EMULSION: Performed by: NURSE ANESTHETIST, CERTIFIED REGISTERED

## 2024-05-29 PROCEDURE — 88172 CYTP DX EVAL FNA 1ST EA SITE: CPT | Performed by: INTERNAL MEDICINE

## 2024-05-29 PROCEDURE — 88305 TISSUE EXAM BY PATHOLOGIST: CPT | Performed by: INTERNAL MEDICINE

## 2024-05-29 RX ORDER — DEXAMETHASONE SODIUM PHOSPHATE 4 MG/ML
4 INJECTION, SOLUTION INTRA-ARTICULAR; INTRALESIONAL; INTRAMUSCULAR; INTRAVENOUS; SOFT TISSUE ONCE AS NEEDED
Status: COMPLETED | OUTPATIENT
Start: 2024-05-29 | End: 2024-05-29

## 2024-05-29 RX ORDER — HYDROCODONE BITARTRATE AND ACETAMINOPHEN 5; 325 MG/1; MG/1
1 TABLET ORAL EVERY 4 HOURS PRN
Status: DISCONTINUED | OUTPATIENT
Start: 2024-05-29 | End: 2024-05-29 | Stop reason: HOSPADM

## 2024-05-29 RX ORDER — IBUPROFEN 600 MG/1
600 TABLET ORAL EVERY 6 HOURS PRN
Status: DISCONTINUED | OUTPATIENT
Start: 2024-05-29 | End: 2024-05-29 | Stop reason: HOSPADM

## 2024-05-29 RX ORDER — SODIUM CHLORIDE, SODIUM LACTATE, POTASSIUM CHLORIDE, CALCIUM CHLORIDE 600; 310; 30; 20 MG/100ML; MG/100ML; MG/100ML; MG/100ML
1000 INJECTION, SOLUTION INTRAVENOUS CONTINUOUS
Status: DISCONTINUED | OUTPATIENT
Start: 2024-05-29 | End: 2024-05-29 | Stop reason: HOSPADM

## 2024-05-29 RX ORDER — SODIUM CHLORIDE 0.9 % (FLUSH) 0.9 %
10 SYRINGE (ML) INJECTION EVERY 12 HOURS SCHEDULED
Status: DISCONTINUED | OUTPATIENT
Start: 2024-05-29 | End: 2024-05-29 | Stop reason: HOSPADM

## 2024-05-29 RX ORDER — SODIUM CHLORIDE, SODIUM LACTATE, POTASSIUM CHLORIDE, CALCIUM CHLORIDE 600; 310; 30; 20 MG/100ML; MG/100ML; MG/100ML; MG/100ML
9 INJECTION, SOLUTION INTRAVENOUS CONTINUOUS
Status: DISCONTINUED | OUTPATIENT
Start: 2024-05-29 | End: 2024-05-29 | Stop reason: HOSPADM

## 2024-05-29 RX ORDER — FENTANYL CITRATE 50 UG/ML
25 INJECTION, SOLUTION INTRAMUSCULAR; INTRAVENOUS
Status: DISCONTINUED | OUTPATIENT
Start: 2024-05-29 | End: 2024-05-29 | Stop reason: HOSPADM

## 2024-05-29 RX ORDER — LABETALOL HYDROCHLORIDE 5 MG/ML
5 INJECTION, SOLUTION INTRAVENOUS
Status: DISCONTINUED | OUTPATIENT
Start: 2024-05-29 | End: 2024-05-29 | Stop reason: HOSPADM

## 2024-05-29 RX ORDER — ONDANSETRON 2 MG/ML
INJECTION INTRAMUSCULAR; INTRAVENOUS AS NEEDED
Status: DISCONTINUED | OUTPATIENT
Start: 2024-05-29 | End: 2024-05-29 | Stop reason: SURG

## 2024-05-29 RX ORDER — FENTANYL CITRATE 50 UG/ML
50 INJECTION, SOLUTION INTRAMUSCULAR; INTRAVENOUS
Status: DISCONTINUED | OUTPATIENT
Start: 2024-05-29 | End: 2024-05-29 | Stop reason: HOSPADM

## 2024-05-29 RX ORDER — FLUMAZENIL 0.1 MG/ML
0.2 INJECTION INTRAVENOUS AS NEEDED
Status: DISCONTINUED | OUTPATIENT
Start: 2024-05-29 | End: 2024-05-29 | Stop reason: HOSPADM

## 2024-05-29 RX ORDER — ROCURONIUM BROMIDE 10 MG/ML
INJECTION, SOLUTION INTRAVENOUS AS NEEDED
Status: DISCONTINUED | OUTPATIENT
Start: 2024-05-29 | End: 2024-05-29 | Stop reason: SURG

## 2024-05-29 RX ORDER — PROPOFOL 10 MG/ML
VIAL (ML) INTRAVENOUS AS NEEDED
Status: DISCONTINUED | OUTPATIENT
Start: 2024-05-29 | End: 2024-05-29 | Stop reason: SURG

## 2024-05-29 RX ORDER — SODIUM CHLORIDE 0.9 % (FLUSH) 0.9 %
3 SYRINGE (ML) INJECTION AS NEEDED
Status: DISCONTINUED | OUTPATIENT
Start: 2024-05-29 | End: 2024-05-29 | Stop reason: HOSPADM

## 2024-05-29 RX ORDER — DROPERIDOL 2.5 MG/ML
0.62 INJECTION, SOLUTION INTRAMUSCULAR; INTRAVENOUS ONCE AS NEEDED
Status: DISCONTINUED | OUTPATIENT
Start: 2024-05-29 | End: 2024-05-29 | Stop reason: HOSPADM

## 2024-05-29 RX ORDER — LIDOCAINE HYDROCHLORIDE 20 MG/ML
INJECTION, SOLUTION EPIDURAL; INFILTRATION; INTRACAUDAL; PERINEURAL AS NEEDED
Status: DISCONTINUED | OUTPATIENT
Start: 2024-05-29 | End: 2024-05-29 | Stop reason: SURG

## 2024-05-29 RX ORDER — DEXTROSE MONOHYDRATE 25 G/50ML
12.5 INJECTION, SOLUTION INTRAVENOUS AS NEEDED
Status: DISCONTINUED | OUTPATIENT
Start: 2024-05-29 | End: 2024-05-29 | Stop reason: HOSPADM

## 2024-05-29 RX ORDER — MIDAZOLAM HYDROCHLORIDE 1 MG/ML
1 INJECTION INTRAMUSCULAR; INTRAVENOUS
Status: DISCONTINUED | OUTPATIENT
Start: 2024-05-29 | End: 2024-05-29 | Stop reason: HOSPADM

## 2024-05-29 RX ORDER — LIDOCAINE HYDROCHLORIDE 10 MG/ML
0.5 INJECTION, SOLUTION EPIDURAL; INFILTRATION; INTRACAUDAL; PERINEURAL ONCE AS NEEDED
Status: DISCONTINUED | OUTPATIENT
Start: 2024-05-29 | End: 2024-05-29 | Stop reason: HOSPADM

## 2024-05-29 RX ORDER — SODIUM CHLORIDE 0.9 % (FLUSH) 0.9 %
10 SYRINGE (ML) INJECTION AS NEEDED
Status: DISCONTINUED | OUTPATIENT
Start: 2024-05-29 | End: 2024-05-29 | Stop reason: HOSPADM

## 2024-05-29 RX ORDER — NALOXONE HCL 0.4 MG/ML
0.4 VIAL (ML) INJECTION AS NEEDED
Status: DISCONTINUED | OUTPATIENT
Start: 2024-05-29 | End: 2024-05-29 | Stop reason: HOSPADM

## 2024-05-29 RX ORDER — FENTANYL CITRATE 50 UG/ML
INJECTION, SOLUTION INTRAMUSCULAR; INTRAVENOUS AS NEEDED
Status: DISCONTINUED | OUTPATIENT
Start: 2024-05-29 | End: 2024-05-29 | Stop reason: SURG

## 2024-05-29 RX ORDER — HYDROCODONE BITARTRATE AND ACETAMINOPHEN 10; 325 MG/1; MG/1
1 TABLET ORAL EVERY 4 HOURS PRN
Status: DISCONTINUED | OUTPATIENT
Start: 2024-05-29 | End: 2024-05-29 | Stop reason: HOSPADM

## 2024-05-29 RX ORDER — ONDANSETRON 2 MG/ML
4 INJECTION INTRAMUSCULAR; INTRAVENOUS ONCE AS NEEDED
Status: DISCONTINUED | OUTPATIENT
Start: 2024-05-29 | End: 2024-05-29 | Stop reason: HOSPADM

## 2024-05-29 RX ADMIN — ONDANSETRON 4 MG: 2 INJECTION INTRAMUSCULAR; INTRAVENOUS at 15:34

## 2024-05-29 RX ADMIN — DEXAMETHASONE SODIUM PHOSPHATE 4 MG: 4 INJECTION, SOLUTION INTRA-ARTICULAR; INTRALESIONAL; INTRAMUSCULAR; INTRAVENOUS; SOFT TISSUE at 14:58

## 2024-05-29 RX ADMIN — SUGAMMADEX 200 MG: 100 INJECTION, SOLUTION INTRAVENOUS at 15:42

## 2024-05-29 RX ADMIN — ROCURONIUM BROMIDE 100 MG: 10 INJECTION, SOLUTION INTRAVENOUS at 15:06

## 2024-05-29 RX ADMIN — FENTANYL CITRATE 100 MCG: 50 INJECTION, SOLUTION INTRAMUSCULAR; INTRAVENOUS at 15:06

## 2024-05-29 RX ADMIN — MIDAZOLAM 1 MG: 1 INJECTION INTRAMUSCULAR; INTRAVENOUS at 14:58

## 2024-05-29 RX ADMIN — SODIUM CHLORIDE, POTASSIUM CHLORIDE, SODIUM LACTATE AND CALCIUM CHLORIDE 1000 ML: 600; 310; 30; 20 INJECTION, SOLUTION INTRAVENOUS at 13:04

## 2024-05-29 RX ADMIN — LIDOCAINE HYDROCHLORIDE 100 MG: 20 INJECTION, SOLUTION EPIDURAL; INFILTRATION; INTRACAUDAL; PERINEURAL at 15:06

## 2024-05-29 RX ADMIN — PROPOFOL 150 MG: 10 INJECTION, EMULSION INTRAVENOUS at 15:06

## 2024-05-29 NOTE — ANESTHESIA PREPROCEDURE EVALUATION
Anesthesia Evaluation     Patient summary reviewed   no history of anesthetic complications:   NPO Solid Status: > 8 hours             Airway   Mallampati: II  Dental      Pulmonary    (+) COPD,  (-) asthma, sleep apnea, not a smoker  Cardiovascular   Exercise tolerance: excellent (>7 METS)    (+) hypertension, hyperlipidemia  (-) pacemaker, past MI, angina, cardiac stents      Neuro/Psych  (-) seizures, TIA, CVA  GI/Hepatic/Renal/Endo    (+) GERD, diabetes mellitus, thyroid problem hypothyroidism  (-) liver disease, no renal disease    Musculoskeletal     Abdominal    Substance History       Comment: Suboxone     OB/GYN          Other                    Anesthesia Plan    ASA 3     general     intravenous induction     Anesthetic plan, risks, benefits, and alternatives have been provided, discussed and informed consent has been obtained with: patient.    CODE STATUS:

## 2024-05-29 NOTE — OP NOTE
Procedure Note (AdvancedBronchoscopy)    Date of Operation: 05/29/24  Pre-op Diagnosis: Mass of middle lobe of right lung  Post-op Diagnosis: Same  Surgeon: Rian Hodge MD  Anesthesia: General    Operation: Flexible fiberoptic bronchoscopy, Bronchoscopy, Diagnostic, Ion robotic shape sensing navigational bronchoscopy, Radial probe endobronchial ultrasound, Linear endobronchial ultrasound, Bronchoalveolar lavage, BAL, Transbronchial biopsy, and Transbronchial needle aspirate of lung with navigational and fluoroscopic guidance  Findings: moderately copious mucus in airways, normal central endobronchial anatomy  Specimen: tbbx lung, tbna lung bal lung  Estimated Blood Loss: Minimal  Complications: none    Indications and History:  The patient is a 59 y.o.  female with Mass of middle lobe of right lung.  The risks, benefits, complications, treatment options and expected outcomes were discussed with the patient.  The possibilities of reaction to medication, pulmonary aspiration, perforation of a viscus, bleeding, failure to diagnose a condition and creating a complication requiring transfusion or operation were discussed with the patient who freely signed the consent.  Time out was taken prior to the procedure.    Description of Procedure:    After the induction of general anesthesia, the patient was positioned supine and the Olympus BF type Q180 bronchoscope was introduced through the endotracheal tube.  The scope was then passed into the trachea.     The trachea, mainstem bronchi, RUL, RML, Bronchus Intermedius, RLL, ANANDA, ANANDA upper division and lingula, and LLL, and all primary segmental airways were examined and were normal except as described below:    Endobronchial findings: scattered mucus throughout the tracheobronchial tree on right side, rul anterior segment, RML segments, suctioned clear by bronchoscopy  Trachea: Normal mucosa  Sharon: Sharp  Right main bronchus: Normal mucosa  Right upper lobe  bronchus: Normal mucosa  Right middle lobe bronchus: Normal mucosa  Right lower lobe bronchus: Normal mucosa  Left main bronchus: Normal mucosa  Left upper lobe bronchus: Normal mucosa  Left lower lobe bronchus: Normal mucosa    The conventional bronchoscope was removed .    Using the planning laptop and Planpoint software, the lesion of interest was identified, and marked, a pathway was generated, and anatomic borders were identified.The ION system was magnetically docked to the ETT. The shape sensing catheter with vision probe was introduced via the  into the ETT.    Registration was started by advancing the bronchoscope into the right and left mainstem bronchi. The main nina was confirmed by rotating the live-view image to match the navigation-view image of the main nina. Registration was completed by advancing the catheter into the identified the lobar bronchi. There was moderate visual divergence. . Using the , the shape sensing bronchoscope was advanced to the target lesion. The mobile C-Arm was brought in, and the vision probe was removed. A peripheral radial ultrasound probe was utilized to better localize the lesion in the right middle lobe lateral segment, giving  no lesional  view.     Transbronchial needle aspirations of the target lesion were performed using an Intuitive Flexision 23 gauge needle and sent for routine cytology. The procedure was guided by fluoroscopy and radial ultrasound. Transbronchial needle aspiration technique was selected because the sampling site was not accessible using standard bronchoscopic techniques. 4 needle aspirations were obtained. Rapid On-Site Evaluation: few bronchial cells    Transbronchial biopsies of the target lesion were performed  using a [employer] superDimension pre-marked biopsy forceps instrument and sent for histopathology examination. The procedure was guided by fluoroscopy and radial ultrasound. Transbronchial biopsy technique was  selected because the sampling site was not visible endoscopically. 4 biopsy samples were obtained. Bronchioalveolar lavage was then performed. 20 ml of iced saline was instilled through the shape sensing catheter. The catheter was connected to the suction syringe and the catheter was retracted slowly, producing an aspiration of 9 ml fluid which was cloudy.     The Olympus BF-XI595A EBUS bronchoscope was introduced and the mediastinum was examined via linear ultrasound.  Findings: no significant mediastinal adenopathy in stations 4, 10, 11 and 7..  The bronchoscope was removed.    The patient was undocked from the ion robot arm.  The Patient was extubated and taken to the Recovery Room in satisfactory condition.      Rian Hodge MD at 16:11 CDT, 5/29/2024

## 2024-05-29 NOTE — ANESTHESIA POSTPROCEDURE EVALUATION
Patient: Ember Cedeno    Procedure Summary       Date: 05/29/24 Room / Location:  [facility] OR 04 /  [facility]    Anesthesia Start: 1501 Anesthesia Stop: 1553    Procedures:       BRONCHOSCOPY WITH ION ROBOT, BRONCHOSCOPY NAVIGATION WITH ENDOBRONCHIAL ULTRASOUND AND ION ROBOT (Right: Bronchus)      BRONCHOSCOPY WITH ENDOBRONCHIAL ULTRASOUND (Right: Bronchus) Diagnosis:       Mass of middle lobe of right lung      (Mass of middle lobe of right lung [R91.8])    Surgeons: Rian Hodge MD Provider: Billy Castro CRNA    Anesthesia Type: general ASA Status: 3            Anesthesia Type: general    Vitals  Vitals Value Taken Time   /75 05/29/24 1615   Temp 97.2 °F (36.2 °C) 05/29/24 1615   Pulse 80 05/29/24 1615   Resp 16 05/29/24 1615   SpO2 95 % 05/29/24 1615           Post Anesthesia Care and Evaluation    PONV Status: none  Comments: Patient d/c from PACU prior to anes eval based on Tatyana score.  Please see RN notes for details of d/c criteria.    Blood pressure 122/73, pulse 75, temperature 97.2 °F (36.2 °C), temperature source Temporal, resp. rate 16, SpO2 93%, not currently breastfeeding.

## 2024-05-29 NOTE — ANESTHESIA PROCEDURE NOTES
Airway  Date/Time: 5/29/2024 3:07 PM  Airway not difficult    General Information and Staff    CRNA/CAA: Billy Castro, MICHAEL    Indications and Patient Condition  Indications for airway management: airway protection    Preoxygenated: yes  MILS maintained throughout  Mask difficulty assessment: 0 - not attempted    Final Airway Details  Final airway type: endotracheal airway      Successful airway: ETT  Cuffed: yes   Successful intubation technique: video laryngoscopy  Facilitating devices/methods: intubating stylet  Endotracheal tube insertion site: oral  Blade: Bond  Blade size: 3  ETT size (mm): 8.0  Cormack-Lehane Classification: grade I - full view of glottis  Placement verified by: chest auscultation and capnometry   Cuff volume (mL): 5  Measured from: teeth  Number of attempts at approach: 1  Assessment: lips, teeth, and gum same as pre-op

## 2024-05-29 NOTE — INTERVAL H&P NOTE
H&P updated. The patient was examined and the following changes are noted:  Preop ct chest did show improvement in a portion of the lesion which was substantial, but some residual density was still present in the area of hypermetabolism.  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  She wishes to proceed today.

## 2024-05-31 LAB
BACTERIA SPEC RESP CULT: NO GROWTH
BEAKER LAB AP INTRAOPERATIVE CONSULTATION: NORMAL
CYTO UR: NORMAL
GRAM STN SPEC: NORMAL
LAB AP CASE REPORT: NORMAL
LAB AP CLINICAL INFORMATION: NORMAL
Lab: NORMAL
PATH REPORT.FINAL DX SPEC: NORMAL
PATH REPORT.GROSS SPEC: NORMAL

## 2024-06-05 LAB
FUNGUS WND CULT: NORMAL
MYCOBACTERIUM SPEC CULT: NORMAL
NIGHT BLUE STAIN TISS: NORMAL
NIGHT BLUE STAIN TISS: NORMAL

## 2024-07-03 LAB
MYCOBACTERIUM SPEC CULT: NORMAL
NIGHT BLUE STAIN TISS: NORMAL
NIGHT BLUE STAIN TISS: NORMAL

## 2024-07-08 LAB — FUNGUS WND CULT: NORMAL

## 2024-07-10 LAB
MYCOBACTERIUM SPEC CULT: NORMAL
NIGHT BLUE STAIN TISS: NORMAL
NIGHT BLUE STAIN TISS: NORMAL

## 2024-07-11 ENCOUNTER — TRANSCRIBE ORDERS (OUTPATIENT)
Dept: ADMINISTRATIVE | Facility: HOSPITAL | Age: 59
End: 2024-07-11
Payer: COMMERCIAL

## 2024-07-11 DIAGNOSIS — Z12.31 ENCOUNTER FOR SCREENING MAMMOGRAM FOR MALIGNANT NEOPLASM OF BREAST: Primary | ICD-10-CM

## 2024-07-14 LAB — NCCN CRITERIA FLAG: NORMAL

## 2024-08-22 ENCOUNTER — TELEPHONE (OUTPATIENT)
Dept: PULMONOLOGY | Facility: CLINIC | Age: 59
End: 2024-08-22
Payer: COMMERCIAL

## 2024-09-03 ENCOUNTER — TELEPHONE (OUTPATIENT)
Dept: MRI IMAGING | Facility: HOSPITAL | Age: 59
End: 2024-09-03
Payer: COMMERCIAL

## 2024-09-03 NOTE — TELEPHONE ENCOUNTER
I called and spoke to patient in regards to overdue imaging and appt with pulmonology. Patient does not want to reschedule either at this time. She said she would reach out to pulmonology office if she changed her mind.

## (undated) DEVICE — ANTIBACTERIAL UNDYED BRAIDED (POLYGLACTIN 910), SYNTHETIC ABSORBABLE SUTURE: Brand: COATED VICRYL

## (undated) DEVICE — DRSNG SURESITE123 4X10IN

## (undated) DEVICE — TBG PRESS EXT

## (undated) DEVICE — ELECTRD BLD EDGE/INSUL1P 2.4X5.1MM STRL

## (undated) DEVICE — CVR PROB GEN PURP W/ISOSILK 5X24IN

## (undated) DEVICE — Device: Brand: BALLOON

## (undated) DEVICE — DISPOSABLE SPECIMEN RADIOGRAPHY SYSTEM WITH LOCALIZING GRID, 4.65" RADIOLUCENT GRID: Brand: ACCUGRID

## (undated) DEVICE — SUT SILK 2/0 SH 30IN K833H

## (undated) DEVICE — ADAPT SWVL FIBROPTIC BRONCH

## (undated) DEVICE — THE CHANNEL CLEANING BRUSH IS A NYLON FLEXI BRUSH ATTACHED TO A FLEXIBLE PLASTIC SHEATH DESIGNED TO SAFELY REMOVE DEBRIS FROM FLEXIBLE ENDOSCOPES.

## (undated) DEVICE — TRY PREP SCRB VAG PVP

## (undated) DEVICE — SPNG LAP PREWSH SFTPK 18X18IN STRL PK/5

## (undated) DEVICE — SINGLE USE BIOPSY VALVE MAJ-210: Brand: SINGLE USE BIOPSY VALVE (STERILE)

## (undated) DEVICE — PK TURNOVER RM ADV

## (undated) DEVICE — 3M™ STERI-STRIP™ REINFORCED ADHESIVE SKIN CLOSURES, R1547, 1/2 IN X 4 IN (12 MM X 100 MM), 6 STRIPS/ENVELOPE: Brand: 3M™ STERI-STRIP™

## (undated) DEVICE — FRCP BIOP SUPERDIMENSION PREMARK

## (undated) DEVICE — ADHS LIQ MASTISOL 2/3ML

## (undated) DEVICE — PAD MINOR UNIVERSAL: Brand: MEDLINE INDUSTRIES, INC.

## (undated) DEVICE — SPNG DISSCT SECTO KTTNER PK/5

## (undated) DEVICE — STCKNT STRL 4X48 IN

## (undated) DEVICE — SENSR O2 OXIMAX FNGR A/ 18IN NONSTR

## (undated) DEVICE — TBG SMPL FLTR LINE NASL 02/C02 A/ BX/100

## (undated) DEVICE — VISION PROBE ADAPTER AND SUCTION ADAPTER

## (undated) DEVICE — GLV SURG BIOGEL LTX PF 6 1/2

## (undated) DEVICE — BIOPSY NEEDLE, 23G: Brand: FLEXISION

## (undated) DEVICE — SINGLE USE SUCTION VALVE MAJ-209: Brand: SINGLE USE SUCTION VALVE (STERILE)

## (undated) DEVICE — SWIVEL CONNECTOR

## (undated) DEVICE — Device: Brand: ION

## (undated) DEVICE — SPNG GZ STRL 2S 4X4 12PLY

## (undated) DEVICE — TRAP,MUCUS SPECIMEN, 80CC: Brand: MEDLINE